# Patient Record
Sex: MALE | Race: WHITE | Employment: FULL TIME | URBAN - NONMETROPOLITAN AREA
[De-identification: names, ages, dates, MRNs, and addresses within clinical notes are randomized per-mention and may not be internally consistent; named-entity substitution may affect disease eponyms.]

---

## 2019-09-25 ENCOUNTER — OFFICE VISIT (OUTPATIENT)
Dept: FAMILY MEDICINE CLINIC | Age: 54
End: 2019-09-25
Payer: COMMERCIAL

## 2019-09-25 VITALS
TEMPERATURE: 98.7 F | BODY MASS INDEX: 30.38 KG/M2 | RESPIRATION RATE: 12 BRPM | SYSTOLIC BLOOD PRESSURE: 142 MMHG | HEIGHT: 71 IN | WEIGHT: 217 LBS | DIASTOLIC BLOOD PRESSURE: 100 MMHG | HEART RATE: 82 BPM

## 2019-09-25 DIAGNOSIS — N52.9 ERECTILE DYSFUNCTION, UNSPECIFIED ERECTILE DYSFUNCTION TYPE: ICD-10-CM

## 2019-09-25 DIAGNOSIS — I10 ESSENTIAL HYPERTENSION: Primary | ICD-10-CM

## 2019-09-25 DIAGNOSIS — Z12.11 SCREENING FOR COLON CANCER: ICD-10-CM

## 2019-09-25 PROCEDURE — G8417 CALC BMI ABV UP PARAM F/U: HCPCS | Performed by: NURSE PRACTITIONER

## 2019-09-25 PROCEDURE — 3017F COLORECTAL CA SCREEN DOC REV: CPT | Performed by: NURSE PRACTITIONER

## 2019-09-25 PROCEDURE — 99203 OFFICE O/P NEW LOW 30 MIN: CPT | Performed by: NURSE PRACTITIONER

## 2019-09-25 PROCEDURE — 93000 ELECTROCARDIOGRAM COMPLETE: CPT | Performed by: NURSE PRACTITIONER

## 2019-09-25 PROCEDURE — 1036F TOBACCO NON-USER: CPT | Performed by: NURSE PRACTITIONER

## 2019-09-25 PROCEDURE — G8427 DOCREV CUR MEDS BY ELIG CLIN: HCPCS | Performed by: NURSE PRACTITIONER

## 2019-09-25 RX ORDER — ASPIRIN 325 MG
325 TABLET ORAL EVERY 6 HOURS PRN
COMMUNITY

## 2019-09-25 ASSESSMENT — PATIENT HEALTH QUESTIONNAIRE - PHQ9
2. FEELING DOWN, DEPRESSED OR HOPELESS: 0
SUM OF ALL RESPONSES TO PHQ QUESTIONS 1-9: 0
SUM OF ALL RESPONSES TO PHQ9 QUESTIONS 1 & 2: 0
SUM OF ALL RESPONSES TO PHQ QUESTIONS 1-9: 0
1. LITTLE INTEREST OR PLEASURE IN DOING THINGS: 0

## 2019-09-26 ENCOUNTER — TELEPHONE (OUTPATIENT)
Dept: FAMILY MEDICINE CLINIC | Age: 54
End: 2019-09-26

## 2019-09-26 ENCOUNTER — NURSE ONLY (OUTPATIENT)
Dept: LAB | Age: 54
End: 2019-09-26

## 2019-09-26 DIAGNOSIS — I10 ESSENTIAL HYPERTENSION: ICD-10-CM

## 2019-09-26 LAB
ALBUMIN SERPL-MCNC: 4.7 G/DL (ref 3.5–5.1)
ALP BLD-CCNC: 42 U/L (ref 38–126)
ALT SERPL-CCNC: 24 U/L (ref 11–66)
ANION GAP SERPL CALCULATED.3IONS-SCNC: 14 MEQ/L (ref 8–16)
AST SERPL-CCNC: 23 U/L (ref 5–40)
BASOPHILS # BLD: 0.4 %
BASOPHILS ABSOLUTE: 0 THOU/MM3 (ref 0–0.1)
BILIRUB SERPL-MCNC: 0.7 MG/DL (ref 0.3–1.2)
BUN BLDV-MCNC: 20 MG/DL (ref 7–22)
CALCIUM SERPL-MCNC: 9.6 MG/DL (ref 8.5–10.5)
CHLORIDE BLD-SCNC: 104 MEQ/L (ref 98–111)
CHOLESTEROL, FASTING: 208 MG/DL (ref 100–199)
CO2: 26 MEQ/L (ref 23–33)
CREAT SERPL-MCNC: 1.1 MG/DL (ref 0.4–1.2)
EOSINOPHIL # BLD: 2.9 %
EOSINOPHILS ABSOLUTE: 0.1 THOU/MM3 (ref 0–0.4)
ERYTHROCYTE [DISTWIDTH] IN BLOOD BY AUTOMATED COUNT: 12.3 % (ref 11.5–14.5)
ERYTHROCYTE [DISTWIDTH] IN BLOOD BY AUTOMATED COUNT: 41.6 FL (ref 35–45)
GFR SERPL CREATININE-BSD FRML MDRD: 70 ML/MIN/1.73M2
GLUCOSE BLD-MCNC: 107 MG/DL (ref 70–108)
HCT VFR BLD CALC: 44 % (ref 42–52)
HDLC SERPL-MCNC: 43 MG/DL
HEMOGLOBIN: 15.3 GM/DL (ref 14–18)
IMMATURE GRANS (ABS): 0 THOU/MM3 (ref 0–0.07)
IMMATURE GRANULOCYTES: 0 %
LDL CHOLESTEROL CALCULATED: 133 MG/DL
LYMPHOCYTES # BLD: 33.1 %
LYMPHOCYTES ABSOLUTE: 1.6 THOU/MM3 (ref 1–4.8)
MCH RBC QN AUTO: 32.1 PG (ref 26–33)
MCHC RBC AUTO-ENTMCNC: 34.8 GM/DL (ref 32.2–35.5)
MCV RBC AUTO: 92.4 FL (ref 80–94)
MONOCYTES # BLD: 9.2 %
MONOCYTES ABSOLUTE: 0.5 THOU/MM3 (ref 0.4–1.3)
NUCLEATED RED BLOOD CELLS: 0 /100 WBC
PLATELET # BLD: 182 THOU/MM3 (ref 130–400)
PMV BLD AUTO: 10 FL (ref 9.4–12.4)
POTASSIUM SERPL-SCNC: 4.6 MEQ/L (ref 3.5–5.2)
RBC # BLD: 4.76 MILL/MM3 (ref 4.7–6.1)
SEG NEUTROPHILS: 54.4 %
SEGMENTED NEUTROPHILS ABSOLUTE COUNT: 2.7 THOU/MM3 (ref 1.8–7.7)
SODIUM BLD-SCNC: 144 MEQ/L (ref 135–145)
TOTAL PROTEIN: 7.2 G/DL (ref 6.1–8)
TRIGLYCERIDE, FASTING: 158 MG/DL (ref 0–199)
TSH SERPL DL<=0.05 MIU/L-ACNC: 1.43 UIU/ML (ref 0.4–4.2)
WBC # BLD: 4.9 THOU/MM3 (ref 4.8–10.8)

## 2019-10-08 ENCOUNTER — TELEPHONE (OUTPATIENT)
Dept: FAMILY MEDICINE CLINIC | Age: 54
End: 2019-10-08

## 2019-10-16 ENCOUNTER — OFFICE VISIT (OUTPATIENT)
Dept: FAMILY MEDICINE CLINIC | Age: 54
End: 2019-10-16
Payer: COMMERCIAL

## 2019-10-16 VITALS
WEIGHT: 218 LBS | TEMPERATURE: 98 F | SYSTOLIC BLOOD PRESSURE: 132 MMHG | RESPIRATION RATE: 14 BRPM | HEART RATE: 88 BPM | BODY MASS INDEX: 29.53 KG/M2 | DIASTOLIC BLOOD PRESSURE: 90 MMHG | HEIGHT: 72 IN

## 2019-10-16 DIAGNOSIS — I10 ESSENTIAL HYPERTENSION: Primary | ICD-10-CM

## 2019-10-16 PROCEDURE — 99213 OFFICE O/P EST LOW 20 MIN: CPT | Performed by: NURSE PRACTITIONER

## 2019-10-16 PROCEDURE — G8484 FLU IMMUNIZE NO ADMIN: HCPCS | Performed by: NURSE PRACTITIONER

## 2019-10-16 PROCEDURE — G8417 CALC BMI ABV UP PARAM F/U: HCPCS | Performed by: NURSE PRACTITIONER

## 2019-10-16 PROCEDURE — 3017F COLORECTAL CA SCREEN DOC REV: CPT | Performed by: NURSE PRACTITIONER

## 2019-10-16 PROCEDURE — 1036F TOBACCO NON-USER: CPT | Performed by: NURSE PRACTITIONER

## 2019-10-16 PROCEDURE — G8427 DOCREV CUR MEDS BY ELIG CLIN: HCPCS | Performed by: NURSE PRACTITIONER

## 2019-10-16 RX ORDER — LISINOPRIL 5 MG/1
5 TABLET ORAL DAILY
Qty: 30 TABLET | Refills: 5 | Status: SHIPPED | OUTPATIENT
Start: 2019-10-16 | End: 2019-11-13 | Stop reason: SDUPTHER

## 2019-10-30 DIAGNOSIS — R09.82 PND (POST-NASAL DRIP): ICD-10-CM

## 2019-10-30 DIAGNOSIS — N52.9 ERECTILE DYSFUNCTION, UNSPECIFIED ERECTILE DYSFUNCTION TYPE: Primary | ICD-10-CM

## 2019-10-30 RX ORDER — FLUTICASONE PROPIONATE 50 MCG
2 SPRAY, SUSPENSION (ML) NASAL DAILY
Qty: 1 BOTTLE | Refills: 1 | Status: SHIPPED | OUTPATIENT
Start: 2019-10-30

## 2019-10-30 RX ORDER — TADALAFIL 20 MG/1
20 TABLET ORAL
Qty: 5 TABLET | Refills: 1 | Status: SHIPPED | OUTPATIENT
Start: 2019-10-30 | End: 2019-11-26 | Stop reason: SDUPTHER

## 2019-11-04 ENCOUNTER — NURSE ONLY (OUTPATIENT)
Dept: LAB | Age: 54
End: 2019-11-04

## 2019-11-04 DIAGNOSIS — I10 ESSENTIAL HYPERTENSION: ICD-10-CM

## 2019-11-04 LAB
ANION GAP SERPL CALCULATED.3IONS-SCNC: 13 MEQ/L (ref 8–16)
BUN BLDV-MCNC: 18 MG/DL (ref 7–22)
CALCIUM SERPL-MCNC: 9.6 MG/DL (ref 8.5–10.5)
CHLORIDE BLD-SCNC: 103 MEQ/L (ref 98–111)
CO2: 26 MEQ/L (ref 23–33)
CREAT SERPL-MCNC: 1 MG/DL (ref 0.4–1.2)
GFR SERPL CREATININE-BSD FRML MDRD: 78 ML/MIN/1.73M2
GLUCOSE BLD-MCNC: 96 MG/DL (ref 70–108)
POTASSIUM SERPL-SCNC: 4.4 MEQ/L (ref 3.5–5.2)
SODIUM BLD-SCNC: 142 MEQ/L (ref 135–145)

## 2019-11-05 ENCOUNTER — TELEPHONE (OUTPATIENT)
Dept: FAMILY MEDICINE CLINIC | Age: 54
End: 2019-11-05

## 2019-11-13 ENCOUNTER — OFFICE VISIT (OUTPATIENT)
Dept: FAMILY MEDICINE CLINIC | Age: 54
End: 2019-11-13
Payer: COMMERCIAL

## 2019-11-13 VITALS
SYSTOLIC BLOOD PRESSURE: 132 MMHG | DIASTOLIC BLOOD PRESSURE: 72 MMHG | HEIGHT: 71 IN | HEART RATE: 87 BPM | BODY MASS INDEX: 30.24 KG/M2 | TEMPERATURE: 97.8 F | WEIGHT: 216 LBS

## 2019-11-13 DIAGNOSIS — I10 ESSENTIAL HYPERTENSION: ICD-10-CM

## 2019-11-13 DIAGNOSIS — J01.90 ACUTE RHINOSINUSITIS: Primary | ICD-10-CM

## 2019-11-13 PROCEDURE — G8427 DOCREV CUR MEDS BY ELIG CLIN: HCPCS | Performed by: NURSE PRACTITIONER

## 2019-11-13 PROCEDURE — 1036F TOBACCO NON-USER: CPT | Performed by: NURSE PRACTITIONER

## 2019-11-13 PROCEDURE — G8484 FLU IMMUNIZE NO ADMIN: HCPCS | Performed by: NURSE PRACTITIONER

## 2019-11-13 PROCEDURE — 99214 OFFICE O/P EST MOD 30 MIN: CPT | Performed by: NURSE PRACTITIONER

## 2019-11-13 PROCEDURE — G8417 CALC BMI ABV UP PARAM F/U: HCPCS | Performed by: NURSE PRACTITIONER

## 2019-11-13 PROCEDURE — 3017F COLORECTAL CA SCREEN DOC REV: CPT | Performed by: NURSE PRACTITIONER

## 2019-11-13 RX ORDER — LISINOPRIL 5 MG/1
5 TABLET ORAL DAILY
Qty: 90 TABLET | Refills: 3 | Status: SHIPPED | OUTPATIENT
Start: 2019-11-13 | End: 2021-11-12 | Stop reason: SDUPTHER

## 2019-11-13 RX ORDER — CEFUROXIME AXETIL 250 MG/1
250 TABLET ORAL 2 TIMES DAILY
Qty: 20 TABLET | Refills: 0 | Status: SHIPPED | OUTPATIENT
Start: 2019-11-13 | End: 2019-11-23

## 2020-06-04 RX ORDER — TADALAFIL 20 MG/1
20 TABLET ORAL
Qty: 5 TABLET | Refills: 3 | Status: SHIPPED | OUTPATIENT
Start: 2020-06-04 | End: 2020-10-05 | Stop reason: SDUPTHER

## 2020-10-05 RX ORDER — TADALAFIL 20 MG/1
20 TABLET ORAL
Qty: 5 TABLET | Refills: 3 | Status: SHIPPED | OUTPATIENT
Start: 2020-10-05 | End: 2021-03-26 | Stop reason: SDUPTHER

## 2020-10-05 NOTE — TELEPHONE ENCOUNTER
Future Appointments   Date Time Provider Dawna Olguin   11/13/2020 10:20 AM MAKAYLA Huynh - [id number] \[facility] [place] [facility]"" [address] [facility] ANURADHA RODRIGUEZ II.VI[place]       Recent Visits  Date Type Provider Dept   11/13/19 Office Visit MAKAYLA Huynh CNP [facility]   10/16/19 Office Visit MAKAYLA Huynh CNP [facility]   09/25/19 Office Visit MAKAYLA Huynh CNP [facility]   Showing recent visits within past 540 days with a meds authorizing provider and meeting all other requirements     Future Appointments  Date Type Provider Dept   11/13/20 Appointment MAKAYLA Huynh CNP [facility]   Showing future appointments within next 150 days with a meds authorizing provider and meeting all other requirements

## 2020-11-13 ENCOUNTER — OFFICE VISIT (OUTPATIENT)
Dept: FAMILY MEDICINE CLINIC | Age: 55
End: 2020-11-13
Payer: COMMERCIAL

## 2020-11-13 VITALS
SYSTOLIC BLOOD PRESSURE: 121 MMHG | RESPIRATION RATE: 14 BRPM | TEMPERATURE: 97.1 F | OXYGEN SATURATION: 98 % | DIASTOLIC BLOOD PRESSURE: 88 MMHG | HEART RATE: 100 BPM | BODY MASS INDEX: 29.55 KG/M2 | WEIGHT: 218.2 LBS | HEIGHT: 72 IN

## 2020-11-13 PROBLEM — N52.9 ERECTILE DYSFUNCTION: Status: ACTIVE | Noted: 2020-11-13

## 2020-11-13 PROBLEM — E78.5 DYSLIPIDEMIA: Status: ACTIVE | Noted: 2020-11-13

## 2020-11-13 PROCEDURE — 99213 OFFICE O/P EST LOW 20 MIN: CPT | Performed by: NURSE PRACTITIONER

## 2020-11-13 ASSESSMENT — PATIENT HEALTH QUESTIONNAIRE - PHQ9
SUM OF ALL RESPONSES TO PHQ QUESTIONS 1-9: 0
SUM OF ALL RESPONSES TO PHQ9 QUESTIONS 1 & 2: 0
SUM OF ALL RESPONSES TO PHQ QUESTIONS 1-9: 0
1. LITTLE INTEREST OR PLEASURE IN DOING THINGS: 0
2. FEELING DOWN, DEPRESSED OR HOPELESS: 0
SUM OF ALL RESPONSES TO PHQ QUESTIONS 1-9: 0

## 2020-11-13 NOTE — PROGRESS NOTES
Chief Complaint   Patient presents with    1 Year Follow Up     no concerns       History obtained from chart review and the patient. SUBJECTIVE:  Ric Peña is a 48 y.o. male that presents today for follow up HTN      HTN    Does patient check BP regularly at home? - Yes - 120 range  Current Medication regimen - Lisinopril  Tolerating medications well? - occasional cough    Shortness of breath or chest pain? No  Headache or visual complaints? Yes - occasionally  Neurologic changes like confusion? No  Extremity edema? No    BP Readings from Last 3 Encounters:   11/13/20 121/88   11/13/19 132/72   10/16/19 (!) 132/90     ED  Tolerating Cialis ok for the most part. He doesn't always have to take the Cialis. He does also get headaches sometimes after taking the Cialis, he is ok with leaving things as they are. Doesn't always get the HA, seems to also occur depending upon what he eats. Libido is ok. No issues with urination.     Age/Gender Health Maintenance    Lipid -   No results found for: CHOL  No results found for: TRIG  Lab Results   Component Value Date    HDL 43 09/26/2019     Lab Results   Component Value Date    LDLCALC 133 09/26/2019     DM Screen - fasting glucose 107 9/26/19  Colon Cancer Screening - 49  Lung Cancer Screening (Age 54 to [de-identified] with 30 pack year hx, current smoker or quit within past 15 years) - n/a    Tetanus - needs  Influenza Vaccine - yearly  Pneumonia Vaccine - 65  Zostavax - 50   HPV Vaccine - n/a    PSA testing discussion - 55  AAA Screening - 65    Falls screening - n/a    Current Outpatient Medications   Medication Sig Dispense Refill    tadalafil (CIALIS) 20 MG tablet Take 1 tablet by mouth every 72 hours as needed for Erectile Dysfunction 5 tablet 3    lisinopril (PRINIVIL;ZESTRIL) 5 MG tablet Take 1 tablet by mouth daily 90 tablet 3    fluticasone (FLONASE) 50 MCG/ACT nasal spray 2 sprays by Each Nostril route daily (Patient taking differently: 2 sprays by Each Nostril route daily AS NEEDED) 1 Bottle 1    aspirin 325 MG tablet Take 325 mg by mouth every 6 hours as needed for Pain       No current facility-administered medications for this visit. No orders of the defined types were placed in this encounter. All medications reviewed and reconciled, including OTC and herbal medications. Updated list given to patient. Patient Active Problem List   Diagnosis    Essential hypertension    Erectile dysfunction    Dyslipidemia       Past Medical History:   Diagnosis Date    Collapsed lung 1997       History reviewed. No pertinent surgical history.     Allergies   Allergen Reactions    Asa [Aspirin] Rash     ASA in high doses        Social History     Socioeconomic History    Marital status:      Spouse name: Not on file    Number of children: Not on file    Years of education: Not on file    Highest education level: Not on file   Occupational History    Not on file   Social Needs    Financial resource strain: Not on file    Food insecurity     Worry: Not on file     Inability: Not on file    Transportation needs     Medical: Not on file     Non-medical: Not on file   Tobacco Use    Smoking status: Former Smoker     Packs/day: 2.00     Years: 6.00     Pack years: 12.00     Last attempt to quit: 1993     Years since quittin.8    Smokeless tobacco: Never Used   Substance and Sexual Activity    Alcohol use: Not on file     Comment: occasional     Drug use: Not on file    Sexual activity: Not on file   Lifestyle    Physical activity     Days per week: Not on file     Minutes per session: Not on file    Stress: Not on file   Relationships    Social connections     Talks on phone: Not on file     Gets together: Not on file     Attends Restoration service: Not on file     Active member of club or organization: Not on file     Attends meetings of clubs or organizations: Not on file     Relationship status: Not on file    Intimate partner violence     Fear of current or ex partner: Not on file     Emotionally abused: Not on file     Physically abused: Not on file     Forced sexual activity: Not on file   Other Topics Concern    Not on file   Social History Narrative    Not on file       Family History   Problem Relation Age of Onset    Mental Illness Mother          I have reviewed the patient's past medical history, past surgical history, allergies, medications, social and family history and I have made updates where appropriate.       Review of Systems  Positive responses are highlighted in bold    Constitutional:  Fever, Chills, Night Sweats, Fatigue, Unexpected changes in weight  Eyes:  Eye discharge, Eye pain, Eye redness, Visual disturbances   HENT:  Ear pain, Tinnitus, Nosebleeds, Trouble swallowing, Hearing loss, Sore throat  Cardiovascular:  Chest Pain, Palpitations, Orthopnea, Paroxysmal Nocturnal Dyspnea  Respiratory:  Cough, Wheezing, Shortness of breath, Chest tightness, Apnea  Gastrointestinal:  Nausea, Vomiting, Diarrhea, Constipation, Heartburn, Blood in stool  Genitourinary:  Difficulty or painful urination, Flank pain, Change in frequency, Urgency  Skin:  Color change, Rash, Itching, Wound  Psychiatric:  Hallucinations, Anxiety, Depression, Suicidal ideation  Hematological:  Enlarged glands, Easy bleeding, Easily bruising  Musculoskeletal:  Joint pain, Back pain, Gait problems, Joint swelling, Myalgias  Neurological:  Dizziness, Headaches, Presyncope, Numbness, Seizures, Tremors  Allergy:  Environmental allergies, Food allergies  Endocrine:  Heat Intolerance, Cold Intolerance, Polydipsia, Polyphagia, Polyuria    Lab Results   Component Value Date     11/04/2019    K 4.4 11/04/2019     11/04/2019    CO2 26 11/04/2019    BUN 18 11/04/2019    CREATININE 1.0 11/04/2019    GLUCOSE 96 11/04/2019    CALCIUM 9.6 11/04/2019    PROT 7.2 09/26/2019    LABALBU 4.7 09/26/2019    BILITOT 0.7 09/26/2019    ALKPHOS 42 09/26/2019 AST 23 09/26/2019    ALT 24 09/26/2019    LABGLOM 78 (A) 11/04/2019       Lab Results   Component Value Date    WBC 4.9 09/26/2019    HGB 15.3 09/26/2019    HCT 44.0 09/26/2019    MCV 92.4 09/26/2019     09/26/2019     Lab Results   Component Value Date    TSH 1.430 09/26/2019         PHYSICAL EXAM:  Vitals:    11/13/20 1019   BP: 121/88   Pulse: 100   Resp: 14   Temp: 97.1 °F (36.2 °C)   TempSrc: Temporal   SpO2: 98%   Weight: 218 lb 3.2 oz (99 kg)   Height: 6' (1.829 m)     Body mass index is 29.59 kg/m². VS Reviewed  General Appearance: A&O x 3, No acute distress,well developed and well- nourished  Head: normocephalic and atraumatic  Eyes: pupils equal, round, and reactive to light, extraocular eye movements intact, conjunctivae and eye lids without erythema  Neck: supple and non-tender without mass, no thyromegaly or thyroid nodules, no cervical lymphadenopathy  Pulmonary/Chest: clear to auscultation bilaterally- no wheezes, rales or rhonchi, normal air movement, no respiratory distress or retractions  Cardiovascular: S1 and S2 auscultated w/ RRR. No murmurs, rubs, clicks, or gallops, distal pulses intact. Abdomen: soft, non-tender, non-distended, bowl sounds physiologic,  no rebound or guarding, no masses or hernias noted. Liver and spleen without enlargement. Extremities: no cyanosis, clubbing or edema of the lower extremities  Musculoskeletal: No joint swelling or gross deformity   Neuro:  Alert, 5/5 strength globally and symmetrically  Psych: Affect appropriate. Mood normal. Thought process is normal without evidence of depression or psychosis. Good insight and appropriate interaction. Cognition and memory appear to be intact. Skin: warm and dry, no rash or erythema  Lymph:  No cervical, auricular or supraclavicular lymph nodes palpated    ASSESSMENT & PLAN  Jamaal was seen today for 1 year follow up.     Diagnoses and all orders for this visit:    Erectile dysfunction, unspecified erectile dysfunction type    Essential hypertension  -     Comprehensive Metabolic Panel; Future  -     Lipid, Fasting; Future    Dyslipidemia  -     Comprehensive Metabolic Panel; Future      - con't Lisinopril 5 mg  - con't Cialis 20 mg prn  - annual labs    DISPOSITION    Return in about 1 year (around 11/13/2021) for chronic conditions. Jamaal released without restrictions. PATIENT COUNSELING    Counseling was provided today regarding the following topics: Healthy eating habits, Regular exercise, substance abuse and healthy sleep habits. Jamaal received counseling on the following healthy behaviors: nutrition and exercise    Patient given educational materials on: See Attached    I have instructed Jamaal to complete a self tracking handout on Blood Pressures  and instructed them to bring it with them to his next appointment. Barriers to learning and self management: none    Discussed use, benefit, and side effects of prescribed medications. Barriers to medication compliance addressed. All patient questions answered. Pt voiced understanding.        Electronically signed by MAKAYLA Hale CNP on 11/13/2020 at 10:49 AM

## 2021-03-23 ENCOUNTER — TELEPHONE (OUTPATIENT)
Dept: FAMILY MEDICINE CLINIC | Age: 56
End: 2021-03-23

## 2021-03-23 ENCOUNTER — HOSPITAL ENCOUNTER (OUTPATIENT)
Age: 56
Discharge: HOME | End: 2021-03-23
Payer: COMMERCIAL

## 2021-03-23 DIAGNOSIS — E78.5 DYSLIPIDEMIA: ICD-10-CM

## 2021-03-23 DIAGNOSIS — I10 ESSENTIAL HYPERTENSION: ICD-10-CM

## 2021-03-23 LAB
ALBUMIN SERPL-MCNC: 4.4 G/DL (ref 3.5–5.1)
ALP BLD-CCNC: 39 U/L (ref 38–126)
ALT SERPL-CCNC: 18 U/L (ref 11–66)
ANION GAP SERPL CALCULATED.3IONS-SCNC: 12 MEQ/L (ref 8–16)
AST SERPL-CCNC: 18 U/L (ref 5–40)
BILIRUB SERPL-MCNC: 0.7 MG/DL (ref 0.3–1.2)
BUN BLDV-MCNC: 25 MG/DL (ref 7–22)
CALCIUM SERPL-MCNC: 9.7 MG/DL (ref 8.5–10.5)
CHLORIDE BLD-SCNC: 104 MEQ/L (ref 98–111)
CHOLESTEROL, FASTING: 223 MG/DL (ref 100–199)
CO2: 26 MEQ/L (ref 23–33)
CREAT SERPL-MCNC: 1.2 MG/DL (ref 0.4–1.2)
GFR SERPL CREATININE-BSD FRML MDRD: 63 ML/MIN/1.73M2
GLUCOSE BLD-MCNC: 89 MG/DL (ref 70–108)
HDLC SERPL-MCNC: 41 MG/DL
LDL CHOLESTEROL CALCULATED: 161 MG/DL
POTASSIUM SERPL-SCNC: 4 MEQ/L (ref 3.5–5.2)
SODIUM BLD-SCNC: 142 MEQ/L (ref 135–145)
TOTAL PROTEIN: 7.4 G/DL (ref 6.1–8)
TRIGLYCERIDE, FASTING: 105 MG/DL (ref 0–199)

## 2021-03-23 PROCEDURE — 36415 COLL VENOUS BLD VENIPUNCTURE: CPT

## 2021-03-23 PROCEDURE — 80053 COMPREHEN METABOLIC PANEL: CPT

## 2021-03-23 PROCEDURE — 80061 LIPID PANEL: CPT

## 2021-03-23 NOTE — TELEPHONE ENCOUNTER
Could not reach patient by phone  Line is busy  Sent message via [employer] for the patient to contact the office regarding his recent lab results

## 2021-03-23 NOTE — TELEPHONE ENCOUNTER
----- Message from MAKAYLA Forman CNP sent at 3/23/2021  3:05 PM EDT -----  Let Lincoln Karla know his labs are stable and within acceptable ranges. Cholesterol worse though than prior. Total cholesterol 223, up from 208. LDL (bad) 161, up from 133. I would consider starting cholesterol medication. Definitely rec'd working on diet/exercise. Is he agreeable to starting med?

## 2021-03-24 NOTE — TELEPHONE ENCOUNTER
Me  to Jeanna Rubinstein          4:16 PM  Good afternoon      We are trying to reach you regarding your lab results from today. Please contact the office at [phone] select option #2     Thank you    Last read by Roxy Leslie at 12:46 AM on 3/24/2021.

## 2021-03-26 ENCOUNTER — PATIENT MESSAGE (OUTPATIENT)
Dept: FAMILY MEDICINE CLINIC | Age: 56
End: 2021-03-26

## 2021-03-26 DIAGNOSIS — N52.9 ERECTILE DYSFUNCTION, UNSPECIFIED ERECTILE DYSFUNCTION TYPE: ICD-10-CM

## 2021-03-26 DIAGNOSIS — E78.5 DYSLIPIDEMIA: Primary | ICD-10-CM

## 2021-03-26 RX ORDER — TADALAFIL 20 MG/1
20 TABLET ORAL
Qty: 5 TABLET | Refills: 3 | Status: SHIPPED | OUTPATIENT
Start: 2021-03-26 | End: 2021-11-12 | Stop reason: SDUPTHER

## 2021-03-26 NOTE — TELEPHONE ENCOUNTER
From: Cheryle Appl  To: MAKAYLA Sheppard - CNP  Sent: 3/26/2021 10:16 AM EDT  Subject: Non-Urgent Medical Question    I'm OK with a cholesterol control medication.

## 2021-03-29 RX ORDER — SIMVASTATIN 40 MG
40 TABLET ORAL EVERY EVENING
Qty: 90 TABLET | Refills: 3 | Status: SHIPPED | OUTPATIENT
Start: 2021-03-29 | End: 2021-11-12 | Stop reason: SDUPTHER

## 2021-06-22 ENCOUNTER — TELEPHONE (OUTPATIENT)
Dept: FAMILY MEDICINE CLINIC | Age: 56
End: 2021-06-22

## 2021-06-22 ENCOUNTER — HOSPITAL ENCOUNTER (OUTPATIENT)
Age: 56
Discharge: HOME | End: 2021-06-22
Payer: COMMERCIAL

## 2021-06-22 DIAGNOSIS — E78.5 DYSLIPIDEMIA: ICD-10-CM

## 2021-06-22 LAB
ALBUMIN SERPL-MCNC: 4.5 G/DL (ref 3.5–5.1)
ALP BLD-CCNC: 39 U/L (ref 38–126)
ALT SERPL-CCNC: 24 U/L (ref 11–66)
AST SERPL-CCNC: 22 U/L (ref 5–40)
BILIRUB SERPL-MCNC: 0.9 MG/DL (ref 0.3–1.2)
BILIRUBIN DIRECT: < 0.2 MG/DL (ref 0–0.3)
CHOLESTEROL, FASTING: 140 MG/DL (ref 100–199)
HDLC SERPL-MCNC: 40 MG/DL
LDL CHOLESTEROL CALCULATED: 73 MG/DL
TOTAL PROTEIN: 7.4 G/DL (ref 6.1–8)
TRIGLYCERIDE, FASTING: 137 MG/DL (ref 0–199)

## 2021-06-22 PROCEDURE — 80076 HEPATIC FUNCTION PANEL: CPT

## 2021-06-22 PROCEDURE — 36415 COLL VENOUS BLD VENIPUNCTURE: CPT

## 2021-06-22 PROCEDURE — 80061 LIPID PANEL: CPT

## 2021-11-12 ENCOUNTER — OFFICE VISIT (OUTPATIENT)
Dept: FAMILY MEDICINE CLINIC | Age: 56
End: 2021-11-12
Payer: COMMERCIAL

## 2021-11-12 VITALS
BODY MASS INDEX: 29.93 KG/M2 | OXYGEN SATURATION: 98 % | SYSTOLIC BLOOD PRESSURE: 142 MMHG | HEIGHT: 72 IN | HEART RATE: 72 BPM | WEIGHT: 221 LBS | RESPIRATION RATE: 12 BRPM | TEMPERATURE: 97.9 F | DIASTOLIC BLOOD PRESSURE: 94 MMHG

## 2021-11-12 DIAGNOSIS — I10 ESSENTIAL HYPERTENSION: ICD-10-CM

## 2021-11-12 DIAGNOSIS — N52.9 ERECTILE DYSFUNCTION, UNSPECIFIED ERECTILE DYSFUNCTION TYPE: ICD-10-CM

## 2021-11-12 DIAGNOSIS — E78.5 DYSLIPIDEMIA: ICD-10-CM

## 2021-11-12 PROCEDURE — 99213 OFFICE O/P EST LOW 20 MIN: CPT | Performed by: NURSE PRACTITIONER

## 2021-11-12 RX ORDER — SIMVASTATIN 40 MG
40 TABLET ORAL EVERY EVENING
Qty: 90 TABLET | Refills: 3 | Status: SHIPPED | OUTPATIENT
Start: 2021-11-12

## 2021-11-12 RX ORDER — TADALAFIL 20 MG/1
20 TABLET ORAL
Qty: 5 TABLET | Refills: 3 | Status: SHIPPED | OUTPATIENT
Start: 2021-11-12 | End: 2022-06-08 | Stop reason: SDUPTHER

## 2021-11-12 RX ORDER — LISINOPRIL 5 MG/1
5 TABLET ORAL DAILY
Qty: 90 TABLET | Refills: 3 | Status: SHIPPED | OUTPATIENT
Start: 2021-11-12

## 2021-11-12 ASSESSMENT — PATIENT HEALTH QUESTIONNAIRE - PHQ9
SUM OF ALL RESPONSES TO PHQ9 QUESTIONS 1 & 2: 0
SUM OF ALL RESPONSES TO PHQ QUESTIONS 1-9: 0
SUM OF ALL RESPONSES TO PHQ QUESTIONS 1-9: 0
2. FEELING DOWN, DEPRESSED OR HOPELESS: 0
1. LITTLE INTEREST OR PLEASURE IN DOING THINGS: 0
SUM OF ALL RESPONSES TO PHQ QUESTIONS 1-9: 0

## 2021-11-12 NOTE — PROGRESS NOTES
Chief Complaint   Patient presents with    Other     Yearly        History obtained from chart review and the patient. SUBJECTIVE:  Caryn Sheikh is a 55 y.o. male that presents today for follow up HTN      HTN    Does patient check BP regularly at home? - no  Current Medication regimen - nothing. He stopped the Lisinopril in the Spring. He ran out. Tolerating medications well? - yes    Shortness of breath or chest pain? No  Headache or visual complaints? Yes - occasionally  Neurologic changes like confusion? No  Extremity edema? No  Dizziness? no    BP Readings from Last 3 Encounters:   11/12/21 (!) 142/94   11/13/20 121/88   11/13/19 132/72     Dyslipidemia    Current Medication regimen - Zocor 40 mg  Tolerating medications well? Yes  Personal History of CAD? No   Hx of CAD in first degree relatives? No  Current smoker? No  History of HTN? Yes  History of DM? No  Goal LDL - < 100    Shortness of breath or chest pain? No  Neurologic changes? No  Extremity edema? No    Lab Results   Component Value Date    LDLCALC 73 06/22/2021     BP Readings from Last 3 Encounters:   11/12/21 (!) 142/94   11/13/20 121/88   11/13/19 132/72     Wt Readings from Last 3 Encounters:   11/12/21 221 lb (100.2 kg)   11/13/20 218 lb 3.2 oz (99 kg)   11/13/19 216 lb (98 kg)     ED  Takes Cialis as needed. It works well for him. Requests refill.     Age/Gender Health Maintenance    Lipid -   No results found for: CHOL  No results found for: TRIG  Lab Results   Component Value Date    HDL 40 06/22/2021    HDL 41 03/23/2021    HDL 43 09/26/2019     Lab Results   Component Value Date    LDLCALC 73 06/22/2021    [address] 03/23/2021    LDLCALC 133 09/26/2019     DM Screen - fasting glucose 107 9/26/19  Colon Cancer Screening - 50  Lung Cancer Screening (Age 54 to [de-identified] with 30 pack year hx, current smoker or quit within past 15 years) - n/a    Tetanus - needs  Influenza Vaccine - yearly  Pneumonia Vaccine - 65  Zostavax - 50   HPV Vaccine - n/a    PSA testing discussion - 55  AAA Screening - 65    Falls screening - n/a    Current Outpatient Medications   Medication Sig Dispense Refill    lisinopril (PRINIVIL;ZESTRIL) 5 MG tablet Take 1 tablet by mouth daily 90 tablet 3    simvastatin (ZOCOR) 40 MG tablet Take 1 tablet by mouth every evening 90 tablet 3    tadalafil (CIALIS) 20 MG tablet Take 1 tablet by mouth every 72 hours as needed for Erectile Dysfunction 5 tablet 3    fluticasone (FLONASE) 50 MCG/ACT nasal spray 2 sprays by Each Nostril route daily (Patient taking differently: 2 sprays by Each Nostril route daily AS NEEDED) 1 Bottle 1    aspirin 325 MG tablet Take 325 mg by mouth every 6 hours as needed for Pain       No current facility-administered medications for this visit. Orders Placed This Encounter   Medications    lisinopril (PRINIVIL;ZESTRIL) 5 MG tablet     Sig: Take 1 tablet by mouth daily     Dispense:  90 tablet     Refill:  3    simvastatin (ZOCOR) 40 MG tablet     Sig: Take 1 tablet by mouth every evening     Dispense:  90 tablet     Refill:  3    tadalafil (CIALIS) 20 MG tablet     Sig: Take 1 tablet by mouth every 72 hours as needed for Erectile Dysfunction     Dispense:  5 tablet     Refill:  3         All medications reviewed and reconciled, including OTC and herbal medications. Updated list given to patient. Patient Active Problem List   Diagnosis    Essential hypertension    Erectile dysfunction    Dyslipidemia       Past Medical History:   Diagnosis Date    Collapsed lung 1997    Dyslipidemia     Erectile dysfunction     Essential hypertension        No past surgical history on file.     Allergies   Allergen Reactions    Asa [Aspirin] Rash     ASA in high doses        Social History     Socioeconomic History    Marital status:      Spouse name: Not on file    Number of children: Not on file    Years of education: Not on file    Highest education level: Not on file   Occupational History    Not on file   Tobacco Use    Smoking status: Former Smoker     Packs/day: 2.00     Years: 6.00     Pack years: 12.00     Quit date: 1993     Years since quittin.8    Smokeless tobacco: Never Used   Substance and Sexual Activity    Alcohol use: Not on file     Comment: occasional     Drug use: Not on file    Sexual activity: Not on file   Other Topics Concern    Not on file   Social History Narrative    Not on file     Social Determinants of Health     Financial Resource Strain:     Difficulty of Paying Living Expenses: Not on file   Food Insecurity:     Worried About Running Out of Food in the Last Year: Not on file    Sana of Food in the Last Year: Not on file   Transportation Needs:     Lack of Transportation (Medical): Not on file    Lack of Transportation (Non-Medical):  Not on file   Physical Activity:     Days of Exercise per Week: Not on file    Minutes of Exercise per Session: Not on file   Stress:     Feeling of Stress : Not on file   Social Connections:     Frequency of Communication with Friends and Family: Not on file    Frequency of Social Gatherings with Friends and Family: Not on file    Attends Restorationism Services: Not on file    Active Member of [address] [place] [address] [employer] or Organizations: Not on file    Attends Club or Organization Meetings: Not on file    Marital Status: Not on file   Intimate Partner Violence:     Fear of Current or Ex-Partner: Not on file    Emotionally Abused: Not on file    Physically Abused: Not on file    Sexually Abused: Not on file   Housing Stability:     Unable to Pay for Housing in the Last Year: Not on file    Number of [place] in the Last Year: Not on file    Unstable Housing in the Last Year: Not on file       Family History   Problem Relation Age of Onset    Mental Illness Mother          I have reviewed the patient's past medical history, past surgical history, allergies, medications, social and family history and I have made updates where appropriate.       Review of Systems  Positive responses are highlighted in bold    Constitutional:  Fever, Chills, Night Sweats, Fatigue, Unexpected changes in weight  Eyes:  Eye discharge, Eye pain, Eye redness, Visual disturbances   HENT:  Ear pain, Tinnitus, Nosebleeds, Trouble swallowing, Hearing loss, Sore throat  Cardiovascular:  Chest Pain, Palpitations, Orthopnea, Paroxysmal Nocturnal Dyspnea  Respiratory:  Cough, Wheezing, Shortness of breath, Chest tightness, Apnea  Gastrointestinal:  Nausea, Vomiting, Diarrhea, Constipation, Heartburn, Blood in stool  Genitourinary:  Difficulty or painful urination, Flank pain, Change in frequency, Urgency  Skin:  Color change, Rash, Itching, Wound  Psychiatric:  Hallucinations, Anxiety, Depression, Suicidal ideation  Hematological:  Enlarged glands, Easy bleeding, Easily bruising  Musculoskeletal:  Joint pain, Back pain, Gait problems, Joint swelling, Myalgias  Neurological:  Dizziness, Headaches, Presyncope, Numbness, Seizures, Tremors  Allergy:  Environmental allergies, Food allergies  Endocrine:  Heat Intolerance, Cold Intolerance, Polydipsia, Polyphagia, Polyuria    Lab Results   Component Value Date     03/23/2021    K 4.0 03/23/2021     03/23/2021    CO2 26 03/23/2021    BUN 25 (H) 03/23/2021    CREATININE 1.2 03/23/2021    GLUCOSE 89 03/23/2021    CALCIUM 9.7 03/23/2021    PROT 7.4 06/22/2021    LABALBU 4.5 06/22/2021    BILITOT 0.9 06/22/2021    ALKPHOS 39 06/22/2021    AST 22 06/22/2021    ALT 24 06/22/2021    LABGLOM 63 (A) 03/23/2021       Lab Results   Component Value Date    WBC 4.9 09/26/2019    HGB 15.3 09/26/2019    HCT 44.0 09/26/2019    MCV 92.4 09/26/2019     09/26/2019     Lab Results   Component Value Date    TSH 1.430 09/26/2019         PHYSICAL EXAM:  Vitals:    11/12/21 1001 11/12/21 1006   BP: (!) 144/92 (!) 142/94   Pulse: 72    Resp: 12    Temp: 97.9 °F (36.6 °C)    TempSrc: Oral    SpO2: 98%    Weight: 221 lb (100.2 kg)    Height: 6' (1.829 m)      Body mass index is 29.97 kg/m². VS Reviewed  General Appearance: A&O x 3, No acute distress,well developed and well- nourished  Head: normocephalic and atraumatic  Eyes: pupils equal, round, and reactive to light, extraocular eye movements intact, conjunctivae and eye lids without erythema  Neck: supple and non-tender without mass, no thyromegaly or thyroid nodules, no cervical lymphadenopathy  Pulmonary/Chest: clear to auscultation bilaterally- no wheezes, rales or rhonchi, normal air movement, no respiratory distress or retractions  Cardiovascular: S1 and S2 auscultated w/ RRR. No murmurs, rubs, clicks, or gallops, distal pulses intact. Abdomen: soft, non-tender, non-distended, bowl sounds physiologic,  no rebound or guarding, no masses or hernias noted. Liver and spleen without enlargement. Extremities: no cyanosis, clubbing or edema of the lower extremities  Musculoskeletal: No joint swelling or gross deformity   Neuro:  Alert, 5/5 strength globally and symmetrically  Psych: Affect appropriate. Mood normal. Thought process is normal without evidence of depression or psychosis. Good insight and appropriate interaction. Cognition and memory appear to be intact. Skin: warm and dry, no rash or erythema  Lymph:  No cervical, auricular or supraclavicular lymph nodes palpated    ASSESSMENT & PLAN  Jamaal was seen today for other. Diagnoses and all orders for this visit:    Essential hypertension  -     lisinopril (PRINIVIL;ZESTRIL) 5 MG tablet; Take 1 tablet by mouth daily  -     Comprehensive Metabolic Panel; Future  -     Lipid, Fasting; Future  -     CBC With Auto Differential; Future    Dyslipidemia  -     simvastatin (ZOCOR) 40 MG tablet; Take 1 tablet by mouth every evening  -     Comprehensive Metabolic Panel; Future  -     Lipid, Fasting; Future    Erectile dysfunction, unspecified erectile dysfunction type  -     tadalafil (CIALIS) 20 MG tablet;  Take 1 tablet by mouth every 72 hours as needed for Erectile Dysfunction      - medication refills  - work on diet/exercise  - recheck BP in a couple weeks  - labs for next year     DISPOSITION    Return in about 1 year (around 11/12/2022) for chronic conditions, BP check in 2 weeks. Jamaal released without restrictions. PATIENT COUNSELING    Counseling was provided today regarding the following topics: Healthy eating habits, Regular exercise, substance abuse and healthy sleep habits. Jamaal received counseling on the following healthy behaviors: nutrition and exercise    Patient given educational materials on: See Attached    I have instructed Jamaal to complete a self tracking handout on Blood Pressures  and instructed them to bring it with them to his next appointment. Barriers to learning and self management: none    Discussed use, benefit, and side effects of prescribed medications. Barriers to medication compliance addressed. All patient questions answered. Pt voiced understanding.        Electronically signed by MAKAYLA Lin CNP on 11/12/2021 at 10:12 AM

## 2022-06-08 DIAGNOSIS — N52.9 ERECTILE DYSFUNCTION, UNSPECIFIED ERECTILE DYSFUNCTION TYPE: ICD-10-CM

## 2022-06-08 RX ORDER — TADALAFIL 20 MG/1
20 TABLET ORAL
Qty: 10 TABLET | Refills: 3 | Status: SHIPPED | OUTPATIENT
Start: 2022-06-08

## 2022-11-14 ENCOUNTER — OFFICE VISIT (OUTPATIENT)
Dept: FAMILY MEDICINE CLINIC | Age: 57
End: 2022-11-14
Payer: COMMERCIAL

## 2022-11-14 VITALS
HEIGHT: 72 IN | HEART RATE: 89 BPM | WEIGHT: 214.2 LBS | RESPIRATION RATE: 14 BRPM | SYSTOLIC BLOOD PRESSURE: 154 MMHG | TEMPERATURE: 98.4 F | OXYGEN SATURATION: 96 % | BODY MASS INDEX: 29.01 KG/M2 | DIASTOLIC BLOOD PRESSURE: 104 MMHG

## 2022-11-14 DIAGNOSIS — Z12.5 SCREENING FOR PROSTATE CANCER: ICD-10-CM

## 2022-11-14 DIAGNOSIS — I10 ESSENTIAL HYPERTENSION: ICD-10-CM

## 2022-11-14 DIAGNOSIS — N52.9 ERECTILE DYSFUNCTION, UNSPECIFIED ERECTILE DYSFUNCTION TYPE: ICD-10-CM

## 2022-11-14 DIAGNOSIS — E78.5 DYSLIPIDEMIA: ICD-10-CM

## 2022-11-14 DIAGNOSIS — Z12.11 SCREENING FOR COLON CANCER: Primary | ICD-10-CM

## 2022-11-14 PROCEDURE — 3078F DIAST BP <80 MM HG: CPT | Performed by: NURSE PRACTITIONER

## 2022-11-14 PROCEDURE — 99214 OFFICE O/P EST MOD 30 MIN: CPT | Performed by: NURSE PRACTITIONER

## 2022-11-14 PROCEDURE — 3074F SYST BP LT 130 MM HG: CPT | Performed by: NURSE PRACTITIONER

## 2022-11-14 RX ORDER — AMLODIPINE BESYLATE 5 MG/1
5 TABLET ORAL DAILY
Qty: 30 TABLET | Refills: 3 | Status: SHIPPED | OUTPATIENT
Start: 2022-11-14

## 2022-11-14 ASSESSMENT — PATIENT HEALTH QUESTIONNAIRE - PHQ9
SUM OF ALL RESPONSES TO PHQ QUESTIONS 1-9: 0
1. LITTLE INTEREST OR PLEASURE IN DOING THINGS: 0
2. FEELING DOWN, DEPRESSED OR HOPELESS: 0
SUM OF ALL RESPONSES TO PHQ9 QUESTIONS 1 & 2: 0
SUM OF ALL RESPONSES TO PHQ QUESTIONS 1-9: 0

## 2022-11-14 NOTE — PROGRESS NOTES
Chief Complaint   Patient presents with    Annual Exam       History obtained from chart review and the patient. SUBJECTIVE:  Gabriel Heredia is a 65 y.o. male that presents today for follow up HTN      HTN    Does patient check BP regularly at home? - no  Current Medication regimen - nothing. He stopped the Lisinopril in the Spring. He stopped it because it was giving him cold symptoms. Tolerating medications well? - yes    Shortness of breath or chest pain? No  Headache or visual complaints? Yes - occasionally  Neurologic changes like confusion? No  Extremity edema? No  Dizziness? no    BP Readings from Last 3 Encounters:   11/14/22 (!) 154/104   11/12/21 (!) 142/94   11/13/20 121/88     Dyslipidemia    Current Medication regimen - Zocor 40 mg, but hasn't take it since the Spring. He reports that he feels like it was giving him sinus symptoms. Since stopped both meds it is better  Tolerating medications well?  no  Personal History of CAD? No   Hx of CAD in first degree relatives? No  Current smoker? No  History of HTN? Yes  History of DM? No  Goal LDL - < 100    Shortness of breath or chest pain? No  Neurologic changes? No  Extremity edema? No    Lab Results   Component Value Date    LDLCALC 73 06/22/2021     BP Readings from Last 3 Encounters:   11/14/22 (!) 154/104   11/12/21 (!) 142/94   11/13/20 121/88     Wt Readings from Last 3 Encounters:   11/14/22 214 lb 3.2 oz (97.2 kg)   11/12/21 221 lb (100.2 kg)   11/13/20 218 lb 3.2 oz (99 kg)     ED  Takes Cialis as needed. It works well for him. Requests refill. Rare use.     Age/Gender Health Maintenance    Lipid -   No results found for: CHOL  No results found for: TRIG  Lab Results   Component Value Date    HDL 40 06/22/2021    HDL 41 03/23/2021    HDL 43 09/26/2019     Lab Results   Component Value Date    LDLCALC 73 06/22/2021    LDLCALC 161 03/23/2021    LDLCALC 133 09/26/2019     DM Screen - fasting glucose 107 9/26/19  Colon Cancer Screening - cathryn 10/7/19 negative  Lung Cancer Screening (Age 54 to [de-identified] with 30 pack year hx, current smoker or quit within past 15 years) - n/a    Tetanus - needs  Influenza Vaccine - yearly  Pneumonia Vaccine - 66  Zostavax - 50   HPV Vaccine - n/a    PSA testing discussion - discussed, + family Hx grandfather on father's side  AAA Screening - 73    Falls screening - n/a    Current Outpatient Medications   Medication Sig Dispense Refill    amLODIPine (NORVASC) 5 MG tablet Take 1 tablet by mouth daily 30 tablet 3    tadalafil (CIALIS) 20 MG tablet Take 1 tablet by mouth every 72 hours as needed for Erectile Dysfunction 10 tablet 3    fluticasone (FLONASE) 50 MCG/ACT nasal spray 2 sprays by Each Nostril route daily (Patient taking differently: 2 sprays by Each Nostril route daily AS NEEDED) 1 Bottle 1    aspirin 325 MG tablet Take 325 mg by mouth every 6 hours as needed for Pain       No current facility-administered medications for this visit. Orders Placed This Encounter   Medications    amLODIPine (NORVASC) 5 MG tablet     Sig: Take 1 tablet by mouth daily     Dispense:  30 tablet     Refill:  3           All medications reviewed and reconciled, including OTC and herbal medications. Updated list given to patient. Patient Active Problem List   Diagnosis    Essential hypertension    Erectile dysfunction    Dyslipidemia       Past Medical History:   Diagnosis Date    Collapsed lung 1997    Dyslipidemia     Erectile dysfunction     Essential hypertension        History reviewed. No pertinent surgical history.     Allergies   Allergen Reactions    Asa [Aspirin] Rash     ASA in high doses        Social History     Socioeconomic History    Marital status:      Spouse name: Not on file    Number of children: Not on file    Years of education: Not on file    Highest education level: Not on file   Occupational History    Not on file   Tobacco Use    Smoking status: Former     Packs/day: 2.00     Years: 6.00 Pack years: 12.00     Types: Cigarettes     Quit date: 1993     Years since quittin.8    Smokeless tobacco: Never   Substance and Sexual Activity    Alcohol use: Not on file     Comment: occasional     Drug use: Not on file    Sexual activity: Not on file   Other Topics Concern    Not on file   Social History Narrative    Not on file     Social Determinants of Health     Financial Resource Strain: Not on file   Food Insecurity: Not on file   Transportation Needs: Not on file   Physical Activity: Not on file   Stress: Not on file   Social Connections: Not on file   Intimate Partner Violence: Not on file   Housing Stability: Not on file       Family History   Problem Relation Age of Onset    Mental Illness Mother          I have reviewed the patient's past medical history, past surgical history, allergies, medications, social and family history and I have made updates where appropriate.       Review of Systems  Positive responses are highlighted in bold    Constitutional:  Fever, Chills, Night Sweats, Fatigue, Unexpected changes in weight  Eyes:  Eye discharge, Eye pain, Eye redness, Visual disturbances   HENT:  Ear pain, Tinnitus, Nosebleeds, Trouble swallowing, Hearing loss, Sore throat  Cardiovascular:  Chest Pain, Palpitations, Orthopnea, Paroxysmal Nocturnal Dyspnea  Respiratory:  Cough, Wheezing, Shortness of breath, Chest tightness, Apnea  Gastrointestinal:  Nausea, Vomiting, Diarrhea, Constipation, Heartburn, Blood in stool  Genitourinary:  Difficulty or painful urination, Flank pain, Change in frequency, Urgency  Skin:  Color change, Rash, Itching, Wound  Psychiatric:  Hallucinations, Anxiety, Depression, Suicidal ideation  Hematological:  Enlarged glands, Easy bleeding, Easily bruising  Musculoskeletal:  Joint pain, Back pain, Gait problems, Joint swelling, Myalgias  Neurological:  Dizziness, Headaches, Presyncope, Numbness, Seizures, Tremors  Allergy:  Environmental allergies, Food allergies  Endocrine:  Heat Intolerance, Cold Intolerance, Polydipsia, Polyphagia, Polyuria    Lab Results   Component Value Date     03/23/2021    K 4.0 03/23/2021     03/23/2021    CO2 26 03/23/2021    BUN 25 (H) 03/23/2021    CREATININE 1.2 03/23/2021    GLUCOSE 89 03/23/2021    CALCIUM 9.7 03/23/2021    PROT 7.4 06/22/2021    LABALBU 4.5 06/22/2021    BILITOT 0.9 06/22/2021    ALKPHOS 39 06/22/2021    AST 22 06/22/2021    ALT 24 06/22/2021    LABGLOM 63 (A) 03/23/2021       Lab Results   Component Value Date    WBC 4.9 09/26/2019    HGB 15.3 09/26/2019    HCT 44.0 09/26/2019    MCV 92.4 09/26/2019     09/26/2019     Lab Results   Component Value Date    TSH 1.430 09/26/2019         PHYSICAL EXAM:  Vitals:    11/14/22 1008 11/14/22 1020   BP: (!) 160/100 (!) 154/104   Pulse: 89    Resp: 14    Temp: 98.4 °F (36.9 °C)    TempSrc: Oral    SpO2: 96%    Weight: 214 lb 3.2 oz (97.2 kg)    Height: 6' (1.829 m)      Body mass index is 29.05 kg/m². VS Reviewed  General Appearance: A&O x 3, No acute distress,well developed and well- nourished  Head: normocephalic and atraumatic  Eyes: pupils equal, round, and reactive to light, extraocular eye movements intact, conjunctivae and eye lids without erythema  Neck: supple and non-tender without mass, no thyromegaly or thyroid nodules, no cervical lymphadenopathy  Pulmonary/Chest: clear to auscultation bilaterally- no wheezes, rales or rhonchi, normal air movement, no respiratory distress or retractions  Cardiovascular: S1 and S2 auscultated w/ RRR. No murmurs, rubs, clicks, or gallops, distal pulses intact. Abdomen: soft, non-tender, non-distended, bowl sounds physiologic,  no rebound or guarding, no masses or hernias noted. Liver and spleen without enlargement.    Extremities: no cyanosis, clubbing or edema of the lower extremities  Musculoskeletal: No joint swelling or gross deformity   Neuro:  Alert, 5/5 strength globally and symmetrically  Psych: Affect appropriate. Mood normal. Thought process is normal without evidence of depression or psychosis. Good insight and appropriate interaction. Cognition and memory appear to be intact. Skin: warm and dry, no rash or erythema  Lymph:  No cervical, auricular or supraclavicular lymph nodes palpated    ASSESSMENT & PLAN  Jamaal was seen today for annual exam.    Diagnoses and all orders for this visit:    Screening for colon cancer  -     Fecal DNA Colorectal cancer screening (Cologuard); Future    Erectile dysfunction, unspecified erectile dysfunction type  -     PSA Screening; Future    Essential hypertension  -     Comprehensive Metabolic Panel; Future  -     Lipid, Fasting; Future  -     CBC with Auto Differential; Future  -     amLODIPine (NORVASC) 5 MG tablet; Take 1 tablet by mouth daily    Dyslipidemia  -     Comprehensive Metabolic Panel; Future  -     Lipid, Fasting; Future    Screening for prostate cancer  -     PSA Screening; Future      - change BP medication to Norvasc, discussed side effects  - wants to hold off on cholesterol med for now until he checks his labs as he  has made diet changes  - con't healthy diet  - annual labs and Cologuard  - has fam Hx of prostate cancer in grandfather, would like PSA checked    DISPOSITION    Return in about 2 weeks (around 11/28/2022) for HTN. [facility] released without restrictions. PATIENT COUNSELING    Counseling was provided today regarding the following topics: Healthy eating habits, Regular exercise, substance abuse and healthy sleep habits. Jamaal received counseling on the following healthy behaviors: nutrition and exercise    Patient given educational materials on: See Attached    I have instructed Jamaal to complete a self tracking handout on Blood Pressures  and instructed them to bring it with them to his next appointment. Barriers to learning and self management: none    Discussed use, benefit, and side effects of prescribed medications. Barriers to medication compliance addressed. All patient questions answered. Pt voiced understanding.        Electronically signed by MAKAYLA Tao CNP on 11/14/2022 at 10:24 AM

## 2022-11-21 ENCOUNTER — HOSPITAL ENCOUNTER (OUTPATIENT)
Age: 57
Discharge: HOME | End: 2022-11-21
Payer: COMMERCIAL

## 2022-11-21 ENCOUNTER — TELEPHONE (OUTPATIENT)
Dept: FAMILY MEDICINE CLINIC | Age: 57
End: 2022-11-21

## 2022-11-21 DIAGNOSIS — Z12.5 SCREENING FOR PROSTATE CANCER: ICD-10-CM

## 2022-11-21 DIAGNOSIS — I10 ESSENTIAL HYPERTENSION: ICD-10-CM

## 2022-11-21 DIAGNOSIS — E78.5 DYSLIPIDEMIA: ICD-10-CM

## 2022-11-21 DIAGNOSIS — N52.9 ERECTILE DYSFUNCTION, UNSPECIFIED ERECTILE DYSFUNCTION TYPE: ICD-10-CM

## 2022-11-21 LAB
ALBUMIN SERPL-MCNC: 4.6 G/DL (ref 3.5–5.1)
ALP BLD-CCNC: 42 U/L (ref 38–126)
ALT SERPL-CCNC: 15 U/L (ref 11–66)
ANION GAP SERPL CALCULATED.3IONS-SCNC: 13 MEQ/L (ref 8–16)
AST SERPL-CCNC: 19 U/L (ref 5–40)
BASOPHILS # BLD: 0.9 %
BASOPHILS ABSOLUTE: 0 THOU/MM3 (ref 0–0.1)
BILIRUB SERPL-MCNC: 0.8 MG/DL (ref 0.3–1.2)
BUN BLDV-MCNC: 22 MG/DL (ref 7–22)
CALCIUM SERPL-MCNC: 9.4 MG/DL (ref 8.5–10.5)
CHLORIDE BLD-SCNC: 105 MEQ/L (ref 98–111)
CHOLESTEROL, FASTING: 187 MG/DL (ref 100–199)
CO2: 25 MEQ/L (ref 23–33)
CREAT SERPL-MCNC: 1 MG/DL (ref 0.4–1.2)
EOSINOPHIL # BLD: 5.9 %
EOSINOPHILS ABSOLUTE: 0.3 THOU/MM3 (ref 0–0.4)
ERYTHROCYTE [DISTWIDTH] IN BLOOD BY AUTOMATED COUNT: 11.9 % (ref 11.5–14.5)
ERYTHROCYTE [DISTWIDTH] IN BLOOD BY AUTOMATED COUNT: 41.1 FL (ref 35–45)
GFR SERPL CREATININE-BSD FRML MDRD: > 60 ML/MIN/1.73M2
GLUCOSE BLD-MCNC: 99 MG/DL (ref 70–108)
HCT VFR BLD CALC: 42.4 % (ref 42–52)
HDLC SERPL-MCNC: 45 MG/DL
HEMOGLOBIN: 14.8 GM/DL (ref 14–18)
IMMATURE GRANS (ABS): 0.01 THOU/MM3 (ref 0–0.07)
IMMATURE GRANULOCYTES: 0.2 %
LDL CHOLESTEROL CALCULATED: 128 MG/DL
LYMPHOCYTES # BLD: 37.2 %
LYMPHOCYTES ABSOLUTE: 1.6 THOU/MM3 (ref 1–4.8)
MCH RBC QN AUTO: 32.5 PG (ref 26–33)
MCHC RBC AUTO-ENTMCNC: 34.9 GM/DL (ref 32.2–35.5)
MCV RBC AUTO: 93 FL (ref 80–94)
MONOCYTES # BLD: 9.5 %
MONOCYTES ABSOLUTE: 0.4 THOU/MM3 (ref 0.4–1.3)
NUCLEATED RED BLOOD CELLS: 0 /100 WBC
PLATELET # BLD: 165 THOU/MM3 (ref 130–400)
PMV BLD AUTO: 9.8 FL (ref 9.4–12.4)
POTASSIUM SERPL-SCNC: 4.3 MEQ/L (ref 3.5–5.2)
PROSTATE SPECIFIC ANTIGEN: 0.54 NG/ML (ref 0–1)
RBC # BLD: 4.56 MILL/MM3 (ref 4.7–6.1)
SEG NEUTROPHILS: 46.3 %
SEGMENTED NEUTROPHILS ABSOLUTE COUNT: 2 THOU/MM3 (ref 1.8–7.7)
SODIUM BLD-SCNC: 143 MEQ/L (ref 135–145)
TOTAL PROTEIN: 6.7 G/DL (ref 6.1–8)
TRIGLYCERIDE, FASTING: 72 MG/DL (ref 0–199)
WBC # BLD: 4.4 THOU/MM3 (ref 4.8–10.8)

## 2022-11-21 PROCEDURE — 36415 COLL VENOUS BLD VENIPUNCTURE: CPT

## 2022-11-21 PROCEDURE — 85025 COMPLETE CBC W/AUTO DIFF WBC: CPT

## 2022-11-21 PROCEDURE — G0103 PSA SCREENING: HCPCS

## 2022-11-21 PROCEDURE — 80053 COMPREHEN METABOLIC PANEL: CPT

## 2022-11-21 PROCEDURE — 80061 LIPID PANEL: CPT

## 2022-11-21 NOTE — TELEPHONE ENCOUNTER
----- Message from MAKAYLA Coffey CNP sent at 11/21/2022  3:19 PM EST -----  Let Stem Adriel know his labs are actually pretty stable and within appropriate ranges. PSA(prostate lab) normal. Cholesterol actually looks pretty good. His LDL (bad) was 128.  I'm ok to hold off on cholesterol meds for now, I would keep doing what he is doing with his diet

## 2022-11-29 ENCOUNTER — OFFICE VISIT (OUTPATIENT)
Dept: FAMILY MEDICINE CLINIC | Age: 57
End: 2022-11-29
Payer: COMMERCIAL

## 2022-11-29 VITALS
RESPIRATION RATE: 14 BRPM | BODY MASS INDEX: 28.53 KG/M2 | HEIGHT: 72 IN | DIASTOLIC BLOOD PRESSURE: 88 MMHG | HEART RATE: 90 BPM | TEMPERATURE: 98 F | OXYGEN SATURATION: 98 % | WEIGHT: 210.6 LBS | SYSTOLIC BLOOD PRESSURE: 152 MMHG

## 2022-11-29 DIAGNOSIS — I10 ESSENTIAL HYPERTENSION: Primary | ICD-10-CM

## 2022-11-29 PROCEDURE — 3078F DIAST BP <80 MM HG: CPT | Performed by: NURSE PRACTITIONER

## 2022-11-29 PROCEDURE — 3074F SYST BP LT 130 MM HG: CPT | Performed by: NURSE PRACTITIONER

## 2022-11-29 PROCEDURE — 99214 OFFICE O/P EST MOD 30 MIN: CPT | Performed by: NURSE PRACTITIONER

## 2022-11-29 NOTE — PROGRESS NOTES
Chief Complaint   Patient presents with    Follow-up     B/P         History obtained from chart review and the patient. SUBJECTIVE:  Mode Barrera is a 65 y.o. male that presents today for follow up HTN    He did not take Norvasc yesterday or today, but otherwise has been taking and it tolerating it well. HTN    Does patient check BP regularly at home? - no  Current Medication regimen - Norvasc 5 mg  Tolerating medications well? - yes    Shortness of breath or chest pain? No  Headache or visual complaints? no  Neurologic changes like confusion? No  Extremity edema?  No  Dizziness? no    BP Readings from Last 3 Encounters:   11/29/22 (!) 152/88   11/14/22 (!) 154/104   11/12/21 (!) 142/94       Age/Gender Health Maintenance    Lipid -   No results found for: CHOL  No results found for: TRIG  Lab Results   Component Value Date    HDL 45 11/21/2022    HDL 40 06/22/2021    HDL 41 03/23/2021     Lab Results   Component Value Date    LDLCALC 128 11/21/2022    [address] 73 06/22/2021    [address] 161 03/23/2021     DM Screen - No results found for: LABA1C    Colon Cancer Screening - cologuard 10/7/19 negative, ordered 11/14/23  Lung Cancer Screening (Age 54 to [de-identified] with 30 pack year hx, current smoker or quit within past 15 years) - n/a    Tetanus - needs  Influenza Vaccine - yearly  Pneumonia Vaccine - 66  Zostavax - 51   HPV Vaccine - n/a    PSA testing discussion -   Lab Results   Component Value Date    PSA 0.54 11/21/2022     AAA Screening - 66    Falls screening - n/a    Current Outpatient Medications   Medication Sig Dispense Refill    amLODIPine (NORVASC) 5 MG tablet Take 1 tablet by mouth daily 30 tablet 3    tadalafil (CIALIS) 20 MG tablet Take 1 tablet by mouth every 72 hours as needed for Erectile Dysfunction 10 tablet 3    fluticasone (FLONASE) 50 MCG/ACT nasal spray 2 sprays by Each Nostril route daily (Patient taking differently: 2 sprays by Each Nostril route daily AS NEEDED) 1 Bottle 1    aspirin 325 MG tablet Take 325 mg by mouth every 6 hours as needed for Pain       No current facility-administered medications for this visit. No orders of the defined types were placed in this encounter. All medications reviewed and reconciled, including OTC and herbal medications. Updated list given to patient. Patient Active Problem List   Diagnosis    Essential hypertension    Erectile dysfunction    Dyslipidemia       Past Medical History:   Diagnosis Date    Collapsed lung     Dyslipidemia     Erectile dysfunction     Essential hypertension        History reviewed. No pertinent surgical history. Allergies   Allergen Reactions    Asa [Aspirin] Rash     ASA in high doses        Social History     Socioeconomic History    Marital status:      Spouse name: Not on file    Number of children: Not on file    Years of education: Not on file    Highest education level: Not on file   Occupational History    Not on file   Tobacco Use    Smoking status: Former     Packs/day: 2.00     Years: 6.00     Pack years: 12.00     Types: Cigarettes     Quit date: 1993     Years since quittin.9    Smokeless tobacco: Never   Substance and Sexual Activity    Alcohol use: Not on file     Comment: occasional     Drug use: Not on file    Sexual activity: Not on file   Other Topics Concern    Not on file   Social History Narrative    Not on file     Social Determinants of Health     Financial Resource Strain: Not on file   Food Insecurity: Not on file   Transportation Needs: Not on file   Physical Activity: Not on file   Stress: Not on file   Social Connections: Not on file   Intimate Partner Violence: Not on file   Housing Stability: Not on file       Family History   Problem Relation Age of Onset    Mental Illness Mother          I have reviewed the patient's past medical history, past surgical history, allergies, medications, social and family history and I have made updates where appropriate.       Review of Systems  Positive responses are highlighted in bold    Constitutional:  Fever, Chills, Night Sweats, Fatigue, Unexpected changes in weight  Eyes:  Eye discharge, Eye pain, Eye redness, Visual disturbances   HENT:  Ear pain, Tinnitus, Nosebleeds, Trouble swallowing, Hearing loss, Sore throat  Cardiovascular:  Chest Pain, Palpitations, Orthopnea, Paroxysmal Nocturnal Dyspnea  Respiratory:  Cough, Wheezing, Shortness of breath, Chest tightness, Apnea  Gastrointestinal:  Nausea, Vomiting, Diarrhea, Constipation, Heartburn, Blood in stool  Genitourinary:  Difficulty or painful urination, Flank pain, Change in frequency, Urgency  Skin:  Color change, Rash, Itching, Wound  Psychiatric:  Hallucinations, Anxiety, Depression, Suicidal ideation  Hematological:  Enlarged glands, Easy bleeding, Easily bruising  Musculoskeletal:  Joint pain, Back pain, Gait problems, Joint swelling, Myalgias  Neurological:  Dizziness, Headaches, Presyncope, Numbness, Seizures, Tremors  Allergy:  Environmental allergies, Food allergies  Endocrine:  Heat Intolerance, Cold Intolerance, Polydipsia, Polyphagia, Polyuria    Lab Results   Component Value Date     11/21/2022    K 4.3 11/21/2022     11/21/2022    CO2 25 11/21/2022    BUN 22 11/21/2022    CREATININE 1.0 11/21/2022    GLUCOSE 99 11/21/2022    CALCIUM 9.4 11/21/2022    PROT 6.7 11/21/2022    LABALBU 4.6 11/21/2022    BILITOT 0.8 11/21/2022    ALKPHOS 42 11/21/2022    AST 19 11/21/2022    ALT 15 11/21/2022    LABGLOM >60 11/21/2022       Lab Results   Component Value Date    WBC 4.4 (L) 11/21/2022    HGB 14.8 11/21/2022    HCT 42.4 11/21/2022    MCV 93.0 11/21/2022     11/21/2022     Lab Results   Component Value Date    TSH 1.430 09/26/2019         PHYSICAL EXAM:  Vitals:    11/29/22 1016 11/29/22 1021   BP: (!) 154/96 (!) 152/88   Pulse: 90    Resp: 14    Temp: 98 °F (36.7 °C)    TempSrc: Oral    SpO2: 98%    Weight: 210 lb 9.6 oz (95.5 kg)    Height: 6' (1.829 m) Body mass index is 28.56 kg/m². VS Reviewed  General Appearance: A&O x 3, No acute distress,well developed and well- nourished  Head: normocephalic and atraumatic  Eyes: pupils equal, round, and reactive to light, extraocular eye movements intact, conjunctivae and eye lids without erythema  Neck: supple and non-tender without mass, no thyromegaly or thyroid nodules, no cervical lymphadenopathy  Pulmonary/Chest: clear to auscultation bilaterally- no wheezes, rales or rhonchi, normal air movement, no respiratory distress or retractions  Cardiovascular: S1 and S2 auscultated w/ RRR. No murmurs, rubs, clicks, or gallops, distal pulses intact. Abdomen: soft, non-tender, non-distended, bowl sounds physiologic,  no rebound or guarding, no masses or hernias noted. Liver and spleen without enlargement. Extremities: no cyanosis, clubbing or edema of the lower extremities  Musculoskeletal: No joint swelling or gross deformity   Neuro:  Alert, 5/5 strength globally and symmetrically  Psych: Affect appropriate. Mood normal. Thought process is normal without evidence of depression or psychosis. Good insight and appropriate interaction. Cognition and memory appear to be intact. Skin: warm and dry, no rash or erythema  Lymph:  No cervical, auricular or supraclavicular lymph nodes palpated    ASSESSMENT & PLAN  Jamaal was seen today for follow-up. Diagnoses and all orders for this visit:    Essential hypertension    - BP not at goal, but hasn't taken his Norvasc in a couple days  - will con't Norvasc 5 mg for now, recheck BP at nurse visit  - reviewed labs-all stable    DISPOSITION    Return in about 2 weeks (around 12/13/2022) for nurse visit for BP check, 3 months HTN. [facility] released without restrictions. PATIENT COUNSELING    Counseling was provided today regarding the following topics: Healthy eating habits, Regular exercise, substance abuse and healthy sleep habits.     Jamaal received counseling on the following healthy behaviors: nutrition and exercise    Patient given educational materials on: See Attached    I have instructed Jamaal to complete a self tracking handout on Blood Pressures  and instructed them to bring it with them to his next appointment. Barriers to learning and self management: none    Discussed use, benefit, and side effects of prescribed medications. Barriers to medication compliance addressed. All patient questions answered. Pt voiced understanding.        Electronically signed by MAKAYLA Vallejo CNP on 11/29/2022 at 10:24 AM

## 2022-12-13 ENCOUNTER — NURSE ONLY (OUTPATIENT)
Dept: FAMILY MEDICINE CLINIC | Age: 57
End: 2022-12-13

## 2022-12-13 ENCOUNTER — TELEPHONE (OUTPATIENT)
Dept: FAMILY MEDICINE CLINIC | Age: 57
End: 2022-12-13

## 2022-12-13 VITALS
DIASTOLIC BLOOD PRESSURE: 88 MMHG | SYSTOLIC BLOOD PRESSURE: 126 MMHG | RESPIRATION RATE: 10 BRPM | OXYGEN SATURATION: 99 % | HEART RATE: 87 BPM

## 2022-12-13 DIAGNOSIS — I10 ESSENTIAL HYPERTENSION: Primary | ICD-10-CM

## 2022-12-13 DIAGNOSIS — I10 ESSENTIAL HYPERTENSION: ICD-10-CM

## 2022-12-13 RX ORDER — AMLODIPINE BESYLATE 10 MG/1
10 TABLET ORAL DAILY
Qty: 30 TABLET | Refills: 3 | Status: SHIPPED | OUTPATIENT
Start: 2022-12-13

## 2022-12-13 NOTE — TELEPHONE ENCOUNTER
Have pt increase norvasc to 10 mg daily as his BP os still elevated.   Follow up with a NV in 2 weeks again for BP check

## 2022-12-13 NOTE — TELEPHONE ENCOUNTER
BP Readings from Last 3 Encounters:   12/13/22 126/88   11/29/22 (!) 152/88   11/14/22 (!) 154/104   Patient presents today for BP check  PULSE 87  O2 99

## 2022-12-13 NOTE — PROGRESS NOTES
Patient presents today for BP check  PULSE 87  O2 99  BP Readings from Last 3 Encounters:   12/13/22 126/88   11/29/22 (!) 152/88   11/14/22 (!) 154/104

## 2022-12-27 ENCOUNTER — NURSE ONLY (OUTPATIENT)
Dept: FAMILY MEDICINE CLINIC | Age: 57
End: 2022-12-27

## 2022-12-27 VITALS — DIASTOLIC BLOOD PRESSURE: 80 MMHG | SYSTOLIC BLOOD PRESSURE: 136 MMHG

## 2023-03-03 ENCOUNTER — OFFICE VISIT (OUTPATIENT)
Dept: FAMILY MEDICINE CLINIC | Age: 58
End: 2023-03-03

## 2023-03-03 VITALS
BODY MASS INDEX: 27.63 KG/M2 | TEMPERATURE: 98.2 F | DIASTOLIC BLOOD PRESSURE: 82 MMHG | OXYGEN SATURATION: 98 % | HEIGHT: 72 IN | WEIGHT: 204 LBS | SYSTOLIC BLOOD PRESSURE: 134 MMHG | HEART RATE: 87 BPM | RESPIRATION RATE: 14 BRPM

## 2023-03-03 DIAGNOSIS — I10 ESSENTIAL HYPERTENSION: ICD-10-CM

## 2023-03-03 DIAGNOSIS — Z12.5 SCREENING FOR PROSTATE CANCER: Primary | ICD-10-CM

## 2023-03-03 DIAGNOSIS — Z80.42 FAMILY HISTORY OF PROSTATE CANCER: ICD-10-CM

## 2023-03-03 RX ORDER — AMLODIPINE BESYLATE 10 MG/1
10 TABLET ORAL DAILY
Qty: 90 TABLET | Refills: 3 | Status: SHIPPED | OUTPATIENT
Start: 2023-03-03

## 2023-03-03 ASSESSMENT — PATIENT HEALTH QUESTIONNAIRE - PHQ9
SUM OF ALL RESPONSES TO PHQ QUESTIONS 1-9: 0
SUM OF ALL RESPONSES TO PHQ9 QUESTIONS 1 & 2: 0
SUM OF ALL RESPONSES TO PHQ QUESTIONS 1-9: 0
SUM OF ALL RESPONSES TO PHQ QUESTIONS 1-9: 0
2. FEELING DOWN, DEPRESSED OR HOPELESS: 0
1. LITTLE INTEREST OR PLEASURE IN DOING THINGS: 0
SUM OF ALL RESPONSES TO PHQ QUESTIONS 1-9: 0

## 2023-03-03 NOTE — PROGRESS NOTES
Chief Complaint   Patient presents with    3 Month Follow-Up         History obtained from chart review and the patient. SUBJECTIVE:  Orlando Liriano is a 65 y.o. male that presents today for follow up HTN    HTN    Does patient check BP regularly at home? - yes, 128/80  Current Medication regimen - Norvasc 10 mg  Tolerating medications well? - yes    Shortness of breath or chest pain? No  Headache or visual complaints? no  Neurologic changes like confusion? No  Extremity edema? No  Dizziness? no    BP Readings from Last 3 Encounters:   23 134/82   22 136/80   22 126/88     Patient's paternal grandfather  from prostate cancer.     Age/Gender Health Maintenance    Lipid -   No results found for: CHOL  No results found for: TRIG  Lab Results   Component Value Date    HDL 45 2022    HDL 40 2021    HDL 41 2021     Lab Results   Component Value Date    LDLCALC 128 2022    [address] 73 2021    [address] 161 2021     DM Screen - No results found for: LABA1C    Colon Cancer Screening - cologuakoby 10/7/19 negative, ordered 23  Lung Cancer Screening (Age 54 to [de-identified] with 30 pack year hx, current smoker or quit within past 15 years) - n/a    Tetanus - needs  Influenza Vaccine - yearly  Pneumonia Vaccine - 65  Zostavax - 50   HPV Vaccine - n/a    PSA testing discussion -   Lab Results   Component Value Date    PSA 0.54 2022     AAA Screening - 66    Falls screening - n/a    Current Outpatient Medications   Medication Sig Dispense Refill    amLODIPine (NORVASC) 10 MG tablet Take 1 tablet by mouth daily 90 tablet 3    tadalafil (CIALIS) 20 MG tablet Take 1 tablet by mouth every 72 hours as needed for Erectile Dysfunction 10 tablet 3    fluticasone (FLONASE) 50 MCG/ACT nasal spray 2 sprays by Each Nostril route daily (Patient taking differently: 2 sprays by Each Nostril route daily AS NEEDED) 1 Bottle 1    aspirin 325 MG tablet Take 325 mg by mouth every 6 hours as needed for Pain       No current facility-administered medications for this visit. Orders Placed This Encounter   Medications    amLODIPine (NORVASC) 10 MG tablet     Sig: Take 1 tablet by mouth daily     Dispense:  90 tablet     Refill:  3             All medications reviewed and reconciled, including OTC and herbal medications. Updated list given to patient. Patient Active Problem List   Diagnosis    Essential hypertension    Erectile dysfunction    Dyslipidemia       Past Medical History:   Diagnosis Date    Collapsed lung     Dyslipidemia     Erectile dysfunction     Essential hypertension        History reviewed. No pertinent surgical history.     Allergies   Allergen Reactions    Asa [Aspirin] Rash     ASA in high doses        Social History     Socioeconomic History    Marital status:      Spouse name: Not on file    Number of children: Not on file    Years of education: Not on file    Highest education level: Not on file   Occupational History    Not on file   Tobacco Use    Smoking status: Former     Packs/day: 2.00     Years: 6.00     Pack years: 12.00     Types: Cigarettes     Quit date: 1993     Years since quittin.1    Smokeless tobacco: Never   Substance and Sexual Activity    Alcohol use: Not on file     Comment: occasional     Drug use: Not on file    Sexual activity: Not on file   Other Topics Concern    Not on file   Social History Narrative    Not on file     Social Determinants of Health     Financial Resource Strain: Not on file   Food Insecurity: Not on file   Transportation Needs: Not on file   Physical Activity: Not on file   Stress: Not on file   Social Connections: Not on file   Intimate Partner Violence: Not on file   Housing Stability: Not on file       Family History   Problem Relation Age of Onset    Mental Illness Mother          I have reviewed the patient's past medical history, past surgical history, allergies, medications, social and family history and I have made updates where appropriate.       Review of Systems  Positive responses are highlighted in bold    Constitutional:  Fever, Chills, Night Sweats, Fatigue, Unexpected changes in weight  Eyes:  Eye discharge, Eye pain, Eye redness, Visual disturbances   HENT:  Ear pain, Tinnitus, Nosebleeds, Trouble swallowing, Hearing loss, Sore throat  Cardiovascular:  Chest Pain, Palpitations, Orthopnea, Paroxysmal Nocturnal Dyspnea  Respiratory:  Cough, Wheezing, Shortness of breath, Chest tightness, Apnea  Gastrointestinal:  Nausea, Vomiting, Diarrhea, Constipation, Heartburn, Blood in stool  Genitourinary:  Difficulty or painful urination, Flank pain, Change in frequency, Urgency  Skin:  Color change, Rash, Itching, Wound  Psychiatric:  Hallucinations, Anxiety, Depression, Suicidal ideation  Hematological:  Enlarged glands, Easy bleeding, Easily bruising  Musculoskeletal:  Joint pain, Back pain, Gait problems, Joint swelling, Myalgias  Neurological:  Dizziness, Headaches, Presyncope, Numbness, Seizures, Tremors  Allergy:  Environmental allergies, Food allergies  Endocrine:  Heat Intolerance, Cold Intolerance, Polydipsia, Polyphagia, Polyuria    Lab Results   Component Value Date     11/21/2022    K 4.3 11/21/2022     11/21/2022    CO2 25 11/21/2022    BUN 22 11/21/2022    CREATININE 1.0 11/21/2022    GLUCOSE 99 11/21/2022    CALCIUM 9.4 11/21/2022    PROT 6.7 11/21/2022    LABALBU 4.6 11/21/2022    BILITOT 0.8 11/21/2022    ALKPHOS 42 11/21/2022    AST 19 11/21/2022    ALT 15 11/21/2022    LABGLOM >60 11/21/2022       Lab Results   Component Value Date    WBC 4.4 (L) 11/21/2022    HGB 14.8 11/21/2022    HCT 42.4 11/21/2022    MCV 93.0 11/21/2022     11/21/2022     Lab Results   Component Value Date    TSH 1.430 09/26/2019         PHYSICAL EXAM:  Vitals:    03/03/23 1101   BP: 134/82   Pulse: 87   Resp: 14   Temp: 98.2 °F (36.8 °C)   TempSrc: Oral   SpO2: 98%   Weight: 204 lb (92.5 kg)   Height: 6' (1.829 m)       Body mass index is 27.67 kg/m². VS Reviewed  General Appearance: A&O x 3, No acute distress,well developed and well- nourished  Head: normocephalic and atraumatic  Eyes: pupils equal, round, and reactive to light, extraocular eye movements intact, conjunctivae and eye lids without erythema  Neck: supple and non-tender without mass, no thyromegaly or thyroid nodules, no cervical lymphadenopathy  Pulmonary/Chest: clear to auscultation bilaterally- no wheezes, rales or rhonchi, normal air movement, no respiratory distress or retractions  Cardiovascular: S1 and S2 auscultated w/ RRR. No murmurs, rubs, clicks, or gallops, distal pulses intact. Abdomen: soft, non-tender, non-distended, bowl sounds physiologic,  no rebound or guarding, no masses or hernias noted. Liver and spleen without enlargement. Extremities: no cyanosis, clubbing or edema of the lower extremities  Musculoskeletal: No joint swelling or gross deformity   Neuro:  Alert, 5/5 strength globally and symmetrically  Psych: Affect appropriate. Mood normal. Thought process is normal without evidence of depression or psychosis. Good insight and appropriate interaction. Cognition and memory appear to be intact. Skin: warm and dry, no rash or erythema  Lymph:  No cervical, auricular or supraclavicular lymph nodes palpated    ASSESSMENT & PLAN  Jamaal was seen today for 3 month follow-up. Diagnoses and all orders for this visit:    Screening for prostate cancer  -     PSA Prostatic Specific Antigen; Future    Essential hypertension  -     amLODIPine (NORVASC) 10 MG tablet; Take 1 tablet by mouth daily  -     Comprehensive Metabolic Panel; Future  -     Lipid, Fasting; Future  -     CBC with Auto Differential; Future    Family history of prostate cancer  -     PSA Prostatic Specific Antigen;  Future    - BP stable and well controlled  - con't Norvasc  - annual labs  - because of family Hx of prostate cancer, I do recommend PSA screening and patient agreeable    DISPOSITION    Return in about 1 year (around 3/3/2024) for chronic conditions. Jamaal released without restrictions. PATIENT COUNSELING    Counseling was provided today regarding the following topics: Healthy eating habits, Regular exercise, substance abuse and healthy sleep habits. Jamaal received counseling on the following healthy behaviors: nutrition and exercise    Patient given educational materials on: See Attached    I have instructed Jamaal to complete a self tracking handout on Blood Pressures  and instructed them to bring it with them to his next appointment. Barriers to learning and self management: none    Discussed use, benefit, and side effects of prescribed medications. Barriers to medication compliance addressed. All patient questions answered. Pt voiced understanding.        Electronically signed by MAKAYLA Guerin CNP on 3/3/2023 at 11:12 AM

## 2024-03-04 ENCOUNTER — OFFICE VISIT (OUTPATIENT)
Dept: FAMILY MEDICINE CLINIC | Age: 59
End: 2024-03-04
Payer: COMMERCIAL

## 2024-03-04 VITALS
WEIGHT: 212.4 LBS | BODY MASS INDEX: 28.77 KG/M2 | DIASTOLIC BLOOD PRESSURE: 102 MMHG | RESPIRATION RATE: 12 BRPM | HEART RATE: 80 BPM | OXYGEN SATURATION: 97 % | SYSTOLIC BLOOD PRESSURE: 144 MMHG | TEMPERATURE: 97.2 F | HEIGHT: 72 IN

## 2024-03-04 DIAGNOSIS — E78.5 DYSLIPIDEMIA: ICD-10-CM

## 2024-03-04 DIAGNOSIS — Z12.5 SCREENING FOR PROSTATE CANCER: ICD-10-CM

## 2024-03-04 DIAGNOSIS — I10 ESSENTIAL HYPERTENSION: ICD-10-CM

## 2024-03-04 DIAGNOSIS — Z00.00 WELL ADULT HEALTH CHECK: Primary | ICD-10-CM

## 2024-03-04 DIAGNOSIS — Z80.42 FAMILY HISTORY OF PROSTATE CANCER: ICD-10-CM

## 2024-03-04 PROCEDURE — 3075F SYST BP GE 130 - 139MM HG: CPT | Performed by: NURSE PRACTITIONER

## 2024-03-04 PROCEDURE — 3079F DIAST BP 80-89 MM HG: CPT | Performed by: NURSE PRACTITIONER

## 2024-03-04 PROCEDURE — 99396 PREV VISIT EST AGE 40-64: CPT | Performed by: NURSE PRACTITIONER

## 2024-03-04 RX ORDER — AMLODIPINE BESYLATE 5 MG/1
5 TABLET ORAL DAILY
Qty: 90 TABLET | Refills: 1 | Status: SHIPPED | OUTPATIENT
Start: 2024-03-04

## 2024-03-04 SDOH — ECONOMIC STABILITY: FOOD INSECURITY: WITHIN THE PAST 12 MONTHS, YOU WORRIED THAT YOUR FOOD WOULD RUN OUT BEFORE YOU GOT MONEY TO BUY MORE.: NEVER TRUE

## 2024-03-04 SDOH — ECONOMIC STABILITY: INCOME INSECURITY: HOW HARD IS IT FOR YOU TO PAY FOR THE VERY BASICS LIKE FOOD, HOUSING, MEDICAL CARE, AND HEATING?: NOT HARD AT ALL

## 2024-03-04 SDOH — ECONOMIC STABILITY: FOOD INSECURITY: WITHIN THE PAST 12 MONTHS, THE FOOD YOU BOUGHT JUST DIDN'T LAST AND YOU DIDN'T HAVE MONEY TO GET MORE.: NEVER TRUE

## 2024-03-04 SDOH — ECONOMIC STABILITY: HOUSING INSECURITY
IN THE LAST 12 MONTHS, WAS THERE A TIME WHEN YOU DID NOT HAVE A STEADY PLACE TO SLEEP OR SLEPT IN A SHELTER (INCLUDING NOW)?: NO

## 2024-03-04 ASSESSMENT — PATIENT HEALTH QUESTIONNAIRE - PHQ9
1. LITTLE INTEREST OR PLEASURE IN DOING THINGS: 0
SUM OF ALL RESPONSES TO PHQ QUESTIONS 1-9: 0
SUM OF ALL RESPONSES TO PHQ QUESTIONS 1-9: 0
2. FEELING DOWN, DEPRESSED OR HOPELESS: 0
SUM OF ALL RESPONSES TO PHQ9 QUESTIONS 1 & 2: 0
SUM OF ALL RESPONSES TO PHQ QUESTIONS 1-9: 0
SUM OF ALL RESPONSES TO PHQ QUESTIONS 1-9: 0

## 2024-03-04 NOTE — PROGRESS NOTES
Chief Complaint   Patient presents with    Follow-up     No issues. Has not been taking Amlodipine.        History obtained from chart review and the patient.    SUBJECTIVE:  Jamaal Holland is a 58 y.o. male that presents today for follow up HTN    Trying to work on keeping his weight down with diet  He is exercising, walking with the dogs several times/day    Wt Readings from Last 3 Encounters:   03/04/24 96.3 kg (212 lb 6.4 oz)   03/03/23 92.5 kg (204 lb)   11/29/22 95.5 kg (210 lb 9.6 oz)     HTN    Does patient check BP regularly at home? - yes, 130-140 range systolic  Current Medication regimen - nothing, he stopped the Norvasc late last summer  Tolerating medications well? - yes    Shortness of breath or chest pain? No  Headache or visual complaints? Some headaches  Neurologic changes like confusion? No  Extremity edema? No  Dizziness? no    BP Readings from Last 3 Encounters:   03/04/24 (!) 144/102   03/03/23 134/82   12/27/22 136/80     Age/Gender Health Maintenance    Lipid -   No results found for: \"CHOL\"  No results found for: \"TRIG\"  Lab Results   Component Value Date    HDL 45 11/21/2022    HDL 40 06/22/2021    HDL 41 03/23/2021     Lab Results   Component Value Date    LDLCALC 128 11/21/2022    LDLCALC 73 06/22/2021    LDLCALC 161 03/23/2021     DM Screen - No results found for: \"LABA1C\"    Colon Cancer Screening - cologuard 10/7/19 negative, ordered 11/14/23  Lung Cancer Screening (Age 55 to 80 with 30 pack year hx, current smoker or quit within past 15 years) - n/a    Tetanus - needs  Influenza Vaccine - yearly  Pneumonia Vaccine - 65  Zostavax - 50   HPV Vaccine - n/a    PSA testing discussion -   Lab Results   Component Value Date    PSA 0.54 11/21/2022     AAA Screening - 65    Falls screening - n/a    Current Outpatient Medications   Medication Sig Dispense Refill    amLODIPine (NORVASC) 5 MG tablet Take 1 tablet by mouth daily 90 tablet 1    fluticasone (FLONASE) 50 MCG/ACT nasal spray 2

## 2024-03-13 ENCOUNTER — HOSPITAL ENCOUNTER (OUTPATIENT)
Age: 59
Discharge: HOME | End: 2024-03-13
Payer: COMMERCIAL

## 2024-03-13 ENCOUNTER — TELEPHONE (OUTPATIENT)
Dept: FAMILY MEDICINE CLINIC | Age: 59
End: 2024-03-13

## 2024-03-13 DIAGNOSIS — E78.5 DYSLIPIDEMIA: ICD-10-CM

## 2024-03-13 DIAGNOSIS — Z12.5 SCREENING FOR PROSTATE CANCER: ICD-10-CM

## 2024-03-13 DIAGNOSIS — I10 ESSENTIAL HYPERTENSION: ICD-10-CM

## 2024-03-13 DIAGNOSIS — Z80.42 FAMILY HISTORY OF PROSTATE CANCER: ICD-10-CM

## 2024-03-13 LAB
ALBUMIN SERPL BCG-MCNC: 4.5 G/DL (ref 3.5–5.1)
ALP SERPL-CCNC: 45 U/L (ref 38–126)
ALT SERPL W/O P-5'-P-CCNC: 16 U/L (ref 11–66)
ANION GAP SERPL CALC-SCNC: 12 MEQ/L (ref 8–16)
AST SERPL-CCNC: 19 U/L (ref 5–40)
BASOPHILS ABSOLUTE: 0 THOU/MM3 (ref 0–0.1)
BASOPHILS NFR BLD AUTO: 0.5 %
BILIRUB SERPL-MCNC: 0.8 MG/DL (ref 0.3–1.2)
BUN SERPL-MCNC: 17 MG/DL (ref 7–22)
CALCIUM SERPL-MCNC: 9.7 MG/DL (ref 8.5–10.5)
CHLORIDE SERPL-SCNC: 103 MEQ/L (ref 98–111)
CHOLESTEROL, FASTING: 208 MG/DL (ref 100–199)
CO2 SERPL-SCNC: 25 MEQ/L (ref 23–33)
CREAT SERPL-MCNC: 1.1 MG/DL (ref 0.4–1.2)
DEPRECATED RDW RBC AUTO: 41.9 FL (ref 35–45)
EOSINOPHIL NFR BLD AUTO: 2.5 %
EOSINOPHILS ABSOLUTE: 0.1 THOU/MM3 (ref 0–0.4)
ERYTHROCYTE [DISTWIDTH] IN BLOOD BY AUTOMATED COUNT: 12.2 % (ref 11.5–14.5)
GFR SERPL CREATININE-BSD FRML MDRD: > 60 ML/MIN/1.73M2
GLUCOSE SERPL-MCNC: 98 MG/DL (ref 70–108)
HCT VFR BLD AUTO: 45.4 % (ref 42–52)
HDLC SERPL-MCNC: 44 MG/DL
HGB BLD-MCNC: 15.9 GM/DL (ref 14–18)
IMM GRANULOCYTES # BLD AUTO: 0.01 THOU/MM3 (ref 0–0.07)
IMM GRANULOCYTES NFR BLD AUTO: 0.2 %
LDLC SERPL CALC-MCNC: 147 MG/DL
LYMPHOCYTES ABSOLUTE: 1.5 THOU/MM3 (ref 1–4.8)
LYMPHOCYTES NFR BLD AUTO: 34.9 %
MCH RBC QN AUTO: 32.6 PG (ref 26–33)
MCHC RBC AUTO-ENTMCNC: 35 GM/DL (ref 32.2–35.5)
MCV RBC AUTO: 93.2 FL (ref 80–94)
MONOCYTES ABSOLUTE: 0.5 THOU/MM3 (ref 0.4–1.3)
MONOCYTES NFR BLD AUTO: 10.4 %
NEUTROPHILS NFR BLD AUTO: 51.5 %
NRBC BLD AUTO-RTO: 0 /100 WBC
PLATELET # BLD AUTO: 165 THOU/MM3 (ref 130–400)
PMV BLD AUTO: 9.8 FL (ref 9.4–12.4)
POTASSIUM SERPL-SCNC: 4 MEQ/L (ref 3.5–5.2)
PROT SERPL-MCNC: 7.6 G/DL (ref 6.1–8)
PSA SERPL-MCNC: 0.89 NG/ML (ref 0–1)
RBC # BLD AUTO: 4.87 MILL/MM3 (ref 4.7–6.1)
SEGMENTED NEUTROPHILS ABSOLUTE COUNT: 2.3 THOU/MM3 (ref 1.8–7.7)
SODIUM SERPL-SCNC: 140 MEQ/L (ref 135–145)
TRIGLYCERIDE, FASTING: 87 MG/DL (ref 0–199)
WBC # BLD AUTO: 4.4 THOU/MM3 (ref 4.8–10.8)

## 2024-03-13 PROCEDURE — 36415 COLL VENOUS BLD VENIPUNCTURE: CPT

## 2024-03-13 PROCEDURE — 80053 COMPREHEN METABOLIC PANEL: CPT

## 2024-03-13 PROCEDURE — 85025 COMPLETE CBC W/AUTO DIFF WBC: CPT

## 2024-03-13 PROCEDURE — 80061 LIPID PANEL: CPT

## 2024-03-13 PROCEDURE — 84153 ASSAY OF PSA TOTAL: CPT

## 2024-03-13 NOTE — TELEPHONE ENCOUNTER
----- Message from MAKAYLA Boswell - CNP sent at 3/13/2024  2:02 PM EDT -----  Let Sebastien know overall labs are stable and in appropriate ranges. His cholesterol is a little high, LDL was 147. HDL (good) was 44. Rec'd just working on diet/exercise. We can f/u as scheduled.

## 2024-03-15 NOTE — TELEPHONE ENCOUNTER
Left message on answering machine requesting pt to call back at earliest convenience.       I have been unable to reach this patient by phone.  A letter is being sent.

## 2024-04-04 ENCOUNTER — OFFICE VISIT (OUTPATIENT)
Dept: FAMILY MEDICINE CLINIC | Age: 59
End: 2024-04-04
Payer: COMMERCIAL

## 2024-04-04 ENCOUNTER — COMMUNITY OUTREACH (OUTPATIENT)
Dept: FAMILY MEDICINE CLINIC | Age: 59
End: 2024-04-04

## 2024-04-04 VITALS
RESPIRATION RATE: 16 BRPM | DIASTOLIC BLOOD PRESSURE: 84 MMHG | BODY MASS INDEX: 28.85 KG/M2 | OXYGEN SATURATION: 97 % | WEIGHT: 213 LBS | HEART RATE: 73 BPM | SYSTOLIC BLOOD PRESSURE: 136 MMHG | TEMPERATURE: 97.8 F | HEIGHT: 72 IN

## 2024-04-04 DIAGNOSIS — I10 ESSENTIAL HYPERTENSION: Primary | ICD-10-CM

## 2024-04-04 DIAGNOSIS — E78.5 DYSLIPIDEMIA: ICD-10-CM

## 2024-04-04 PROCEDURE — 3079F DIAST BP 80-89 MM HG: CPT | Performed by: NURSE PRACTITIONER

## 2024-04-04 PROCEDURE — 99214 OFFICE O/P EST MOD 30 MIN: CPT | Performed by: NURSE PRACTITIONER

## 2024-04-04 PROCEDURE — 3075F SYST BP GE 130 - 139MM HG: CPT | Performed by: NURSE PRACTITIONER

## 2024-04-04 NOTE — PROGRESS NOTES
Chief Complaint   Patient presents with    1 Month Follow-Up     HTN       History obtained from chart review and the patient.    SUBJECTIVE:  Jamaal Holland is a 58 y.o. male that presents today for follow up HTN    Does patient check BP regularly at home? - yes, 140-150/100  Current Medication regimen - Norvasc 5 mg  Tolerating medications well? - yes    Shortness of breath or chest pain? No  Headache or visual complaints? no  Neurologic changes like confusion? No  Extremity edema? No  Dizziness? no    BP Readings from Last 3 Encounters:   04/04/24 136/84   03/04/24 (!) 144/102   03/03/23 134/82     Dyslipidemia  Would like to avoid taking cholesterol meds  Is going to try to work on diet  Maybe ok to recheck in 6 months    Age/Gender Health Maintenance    Lipid -   No results found for: \"CHOL\"  No results found for: \"TRIG\"  Lab Results   Component Value Date    HDL 44 03/13/2024    HDL 45 11/21/2022    HDL 40 06/22/2021     Lab Results   Component Value Date    LDLCALC 147 03/13/2024    LDLCALC 128 11/21/2022    LDLCALC 73 06/22/2021     DM Screen - No results found for: \"LABA1C\"    Colon Cancer Screening - cologuard 10/7/19 negative, ordered 11/14/23  Lung Cancer Screening (Age 55 to 80 with 30 pack year hx, current smoker or quit within past 15 years) - n/a    Tetanus - needs  Influenza Vaccine - yearly  Pneumonia Vaccine - 65  Zostavax - 50   HPV Vaccine - n/a    PSA testing discussion -   Lab Results   Component Value Date    PSA 0.89 03/13/2024    PSA 0.54 11/21/2022     AAA Screening - 65    Falls screening - n/a    Current Outpatient Medications   Medication Sig Dispense Refill    amLODIPine (NORVASC) 5 MG tablet Take 1 tablet by mouth daily 90 tablet 1    aspirin 325 MG tablet Take 1 tablet by mouth every 6 hours as needed for Pain       No current facility-administered medications for this visit.     No orders of the defined types were placed in this encounter.            All medications reviewed and

## 2024-10-07 ENCOUNTER — OFFICE VISIT (OUTPATIENT)
Dept: FAMILY MEDICINE CLINIC | Age: 59
End: 2024-10-07
Payer: COMMERCIAL

## 2024-10-07 VITALS
HEART RATE: 98 BPM | BODY MASS INDEX: 29.17 KG/M2 | RESPIRATION RATE: 14 BRPM | HEIGHT: 72 IN | TEMPERATURE: 97.6 F | SYSTOLIC BLOOD PRESSURE: 128 MMHG | DIASTOLIC BLOOD PRESSURE: 86 MMHG | WEIGHT: 215.4 LBS | OXYGEN SATURATION: 99 %

## 2024-10-07 DIAGNOSIS — I10 ESSENTIAL HYPERTENSION: ICD-10-CM

## 2024-10-07 DIAGNOSIS — E78.5 DYSLIPIDEMIA: Primary | ICD-10-CM

## 2024-10-07 PROCEDURE — 99214 OFFICE O/P EST MOD 30 MIN: CPT | Performed by: NURSE PRACTITIONER

## 2024-10-07 PROCEDURE — 3079F DIAST BP 80-89 MM HG: CPT | Performed by: NURSE PRACTITIONER

## 2024-10-07 PROCEDURE — 3074F SYST BP LT 130 MM HG: CPT | Performed by: NURSE PRACTITIONER

## 2024-10-07 RX ORDER — AMLODIPINE BESYLATE 5 MG/1
5 TABLET ORAL DAILY
Qty: 90 TABLET | Refills: 3 | Status: SHIPPED | OUTPATIENT
Start: 2024-10-07

## 2024-10-07 NOTE — PROGRESS NOTES
HTN.            An electronic signature was used to authenticate this note.    --Yuriy Cason   
- Transportation     Lack of Transportation (Medical): Not on file     Lack of Transportation (Non-Medical): No   Physical Activity: Not on file   Stress: Not on file   Social Connections: Not on file   Intimate Partner Violence: Not on file   Housing Stability: Unknown (3/4/2024)    Housing Stability Vital Sign     Unable to Pay for Housing in the Last Year: Not on file     Number of Places Lived in the Last Year: Not on file     Unstable Housing in the Last Year: No       Family History   Problem Relation Age of Onset    Mental Illness Mother          I have reviewed the patient's past medical history, past surgical history, allergies, medications, social and family history and I have made updates where appropriate.      Review of Systems  Positive responses are highlighted in bold    Constitutional:  Fever, Chills, Night Sweats, Fatigue, Unexpected changes in weight  Eyes:  Eye discharge, Eye pain, Eye redness, Visual disturbances   HENT:  Ear pain, Tinnitus, Nosebleeds, Trouble swallowing, Hearing loss, Sore throat  Cardiovascular:  Chest Pain, Palpitations, Orthopnea, Paroxysmal Nocturnal Dyspnea  Respiratory:  Cough, Wheezing, Shortness of breath, Chest tightness, Apnea  Gastrointestinal:  Nausea, Vomiting, Diarrhea, Constipation, Heartburn, Blood in stool  Genitourinary:  Difficulty or painful urination, Flank pain, Change in frequency, Urgency  Skin:  Color change, Rash, Itching, Wound  Psychiatric:  Hallucinations, Anxiety, Depression, Suicidal ideation  Hematological:  Enlarged glands, Easy bleeding, Easily bruising  Musculoskeletal:  Joint pain, Back pain, Gait problems, Joint swelling, Myalgias  Neurological:  Dizziness, Headaches, Presyncope, Numbness, Seizures, Tremors  Allergy:  Environmental allergies, Food allergies  Endocrine:  Heat Intolerance, Cold Intolerance, Polydipsia, Polyphagia, Polyuria    Lab Results   Component Value Date     03/13/2024    K 4.0 03/13/2024     03/13/2024

## 2024-10-18 ENCOUNTER — HOSPITAL ENCOUNTER (OUTPATIENT)
Age: 59
Discharge: HOME | End: 2024-10-18
Payer: COMMERCIAL

## 2024-10-18 DIAGNOSIS — E78.5 DYSLIPIDEMIA: ICD-10-CM

## 2024-10-18 LAB
CHOLESTEROL, FASTING: 207 MG/DL (ref 100–199)
HDLC SERPL-MCNC: 45 MG/DL
LDLC SERPL CALC-MCNC: 139 MG/DL
TRIGLYCERIDE, FASTING: 117 MG/DL (ref 0–199)

## 2024-10-18 PROCEDURE — 36415 COLL VENOUS BLD VENIPUNCTURE: CPT

## 2024-10-18 PROCEDURE — 80061 LIPID PANEL: CPT

## 2024-10-21 ENCOUNTER — TELEPHONE (OUTPATIENT)
Dept: FAMILY MEDICINE CLINIC | Age: 59
End: 2024-10-21

## 2024-10-21 NOTE — TELEPHONE ENCOUNTER
Left message on answering machine. Requested pt to call back at [phone], at their earliest convenience.

## 2024-10-21 NOTE — TELEPHONE ENCOUNTER
----- Message from MAKAYLA Regalado CNP sent at 10/21/2024  9:32 AM EDT -----  Let Sebastien know his cholesterol is still mildly elevated, but better that last time. His LDL (bad) was 139, down from 147. I know he's wanting to avoid cholesterol meds, and I'm ok with that. I would rec'd he continue working on diet exercise. He can take Niacin which is an OTC B vitamin which helps lower cholesterol and red yeast rice. These are both OTC supplements for cholesterol.

## 2024-10-22 NOTE — TELEPHONE ENCOUNTER
Left message on answering machine. Requested pt to call back at [phone], at their earliest convenience.

## 2024-10-23 NOTE — TELEPHONE ENCOUNTER
Left message on answering machine. Requested pt to call back at [phone], at their earliest convenience.     I have been unable to reach this patient by phone.  A letter is being sent.